# Patient Record
Sex: MALE | Race: WHITE | NOT HISPANIC OR LATINO | Employment: OTHER | URBAN - METROPOLITAN AREA
[De-identification: names, ages, dates, MRNs, and addresses within clinical notes are randomized per-mention and may not be internally consistent; named-entity substitution may affect disease eponyms.]

---

## 2023-04-25 ENCOUNTER — APPOINTMENT (OUTPATIENT)
Dept: PRIMARY CARE | Facility: CLINIC | Age: 68
End: 2023-04-25
Payer: MEDICARE

## 2023-06-12 PROBLEM — R35.1 NOCTURIA: Status: ACTIVE | Noted: 2023-06-12

## 2023-06-12 PROBLEM — E80.6 HYPERBILIRUBINEMIA: Status: ACTIVE | Noted: 2023-06-12

## 2023-06-12 PROBLEM — E55.9 VITAMIN D INSUFFICIENCY: Status: ACTIVE | Noted: 2023-06-12

## 2023-06-12 PROBLEM — M22.42 CHONDROMALACIA OF LEFT PATELLA: Status: ACTIVE | Noted: 2023-06-12

## 2023-06-12 PROBLEM — M25.562 LEFT KNEE PAIN: Status: ACTIVE | Noted: 2023-06-12

## 2023-06-12 PROBLEM — R73.9 HYPERGLYCEMIA: Status: ACTIVE | Noted: 2023-06-12

## 2023-06-12 PROBLEM — D22.9 SKIN MOLE: Status: ACTIVE | Noted: 2023-06-12

## 2023-06-12 PROBLEM — H61.23 BILATERAL IMPACTED CERUMEN: Status: ACTIVE | Noted: 2023-06-12

## 2023-06-12 PROBLEM — M25.552 PAIN OF LEFT HIP JOINT: Status: ACTIVE | Noted: 2023-06-12

## 2023-06-12 PROBLEM — I10 BENIGN ESSENTIAL HYPERTENSION: Status: ACTIVE | Noted: 2023-06-12

## 2023-06-12 PROBLEM — E78.5 HYPERLIPIDEMIA: Status: ACTIVE | Noted: 2023-06-12

## 2023-06-12 RX ORDER — LISINOPRIL 10 MG/1
1 TABLET ORAL DAILY
COMMUNITY
Start: 2018-08-14 | End: 2023-06-15 | Stop reason: ALTCHOICE

## 2023-06-12 RX ORDER — MUPIROCIN 20 MG/G
OINTMENT TOPICAL
COMMUNITY
Start: 2023-05-06 | End: 2023-06-15 | Stop reason: ALTCHOICE

## 2023-06-12 RX ORDER — CEPHALEXIN 500 MG/1
CAPSULE ORAL
COMMUNITY
Start: 2023-05-06 | End: 2023-06-15 | Stop reason: ALTCHOICE

## 2023-06-12 RX ORDER — TAMSULOSIN HYDROCHLORIDE 0.4 MG/1
1 CAPSULE ORAL DAILY
COMMUNITY
Start: 2018-08-14 | End: 2023-06-15 | Stop reason: ALTCHOICE

## 2023-06-12 RX ORDER — SULFAMETHOXAZOLE AND TRIMETHOPRIM 800; 160 MG/1; MG/1
TABLET ORAL
COMMUNITY
Start: 2023-05-06 | End: 2023-06-15 | Stop reason: ALTCHOICE

## 2023-06-12 RX ORDER — MULTIVITAMIN
1 TABLET ORAL DAILY
COMMUNITY
Start: 2016-11-07 | End: 2023-06-15 | Stop reason: ALTCHOICE

## 2023-06-15 ENCOUNTER — OFFICE VISIT (OUTPATIENT)
Dept: PRIMARY CARE | Facility: CLINIC | Age: 68
End: 2023-06-15
Payer: MEDICARE

## 2023-06-15 VITALS
BODY MASS INDEX: 29.68 KG/M2 | HEIGHT: 71 IN | TEMPERATURE: 98 F | HEART RATE: 85 BPM | SYSTOLIC BLOOD PRESSURE: 155 MMHG | DIASTOLIC BLOOD PRESSURE: 79 MMHG | WEIGHT: 212 LBS | OXYGEN SATURATION: 95 %

## 2023-06-15 DIAGNOSIS — I10 BENIGN ESSENTIAL HYPERTENSION: Primary | ICD-10-CM

## 2023-06-15 DIAGNOSIS — R73.9 HYPERGLYCEMIA: ICD-10-CM

## 2023-06-15 DIAGNOSIS — E78.5 HYPERLIPIDEMIA, UNSPECIFIED HYPERLIPIDEMIA TYPE: ICD-10-CM

## 2023-06-15 DIAGNOSIS — G89.29 CHRONIC PAIN OF BOTH SHOULDERS: ICD-10-CM

## 2023-06-15 DIAGNOSIS — E55.9 VITAMIN D DEFICIENCY: ICD-10-CM

## 2023-06-15 DIAGNOSIS — M25.512 CHRONIC PAIN OF BOTH SHOULDERS: ICD-10-CM

## 2023-06-15 DIAGNOSIS — E55.9 VITAMIN D INSUFFICIENCY: ICD-10-CM

## 2023-06-15 DIAGNOSIS — M25.511 CHRONIC PAIN OF BOTH SHOULDERS: ICD-10-CM

## 2023-06-15 PROCEDURE — 1159F MED LIST DOCD IN RCRD: CPT | Performed by: STUDENT IN AN ORGANIZED HEALTH CARE EDUCATION/TRAINING PROGRAM

## 2023-06-15 PROCEDURE — 1036F TOBACCO NON-USER: CPT | Performed by: STUDENT IN AN ORGANIZED HEALTH CARE EDUCATION/TRAINING PROGRAM

## 2023-06-15 PROCEDURE — 99214 OFFICE O/P EST MOD 30 MIN: CPT | Performed by: STUDENT IN AN ORGANIZED HEALTH CARE EDUCATION/TRAINING PROGRAM

## 2023-06-15 PROCEDURE — 1160F RVW MEDS BY RX/DR IN RCRD: CPT | Performed by: STUDENT IN AN ORGANIZED HEALTH CARE EDUCATION/TRAINING PROGRAM

## 2023-06-15 PROCEDURE — 3078F DIAST BP <80 MM HG: CPT | Performed by: STUDENT IN AN ORGANIZED HEALTH CARE EDUCATION/TRAINING PROGRAM

## 2023-06-15 PROCEDURE — 3077F SYST BP >= 140 MM HG: CPT | Performed by: STUDENT IN AN ORGANIZED HEALTH CARE EDUCATION/TRAINING PROGRAM

## 2023-06-15 ASSESSMENT — COLUMBIA-SUICIDE SEVERITY RATING SCALE - C-SSRS
6. HAVE YOU EVER DONE ANYTHING, STARTED TO DO ANYTHING, OR PREPARED TO DO ANYTHING TO END YOUR LIFE?: NO
1. IN THE PAST MONTH, HAVE YOU WISHED YOU WERE DEAD OR WISHED YOU COULD GO TO SLEEP AND NOT WAKE UP?: NO
2. HAVE YOU ACTUALLY HAD ANY THOUGHTS OF KILLING YOURSELF?: NO

## 2023-06-15 ASSESSMENT — PATIENT HEALTH QUESTIONNAIRE - PHQ9
SUM OF ALL RESPONSES TO PHQ9 QUESTIONS 1 AND 2: 0
1. LITTLE INTEREST OR PLEASURE IN DOING THINGS: NOT AT ALL
2. FEELING DOWN, DEPRESSED OR HOPELESS: NOT AT ALL

## 2023-06-15 ASSESSMENT — ENCOUNTER SYMPTOMS
DEPRESSION: 0
LOSS OF SENSATION IN FEET: 0
OCCASIONAL FEELINGS OF UNSTEADINESS: 0

## 2023-06-15 NOTE — PROGRESS NOTES
67-year-old male presenting to [facility] care for follow-up on chronic conditions:    HTN, HLD, hyperglycemia,  Not taking any medications.  Patient feels well, and is hesitant to take prescription meds.    Vitamin D deficiency  Stable does take OTC supplementation    Bilateral shoulder pain  Chronic, insidious, no traumatic onset.    12 point ROS reviewed and negative other than as stated in HPI    General: Alert, oriented, pleasant, in no acute distress  HEENT:      Head: normocephalic, atraumatic;      eyes: EOMI, no scleral icterus;   CV: Heart with regular rate and rhythm, normal S1/S2, no murmurs  Lungs: CTAB without wheezing, rhonchi or rales; good respiratory effort, no increased work of breathing  Abdomen: soft, non-tender, non-distended, no masses appreciated  Extremities: no edema, no cyanosis  MSK: Bilateral positive Rosalva's testing negative AC, negative bicep  Neuro: Cranial nerves grossly intact; alert and oriented  Psych: Appropriate mood and affect    1.  HTN  - Above goal in office  -Discussed treatment and importance, patient not wanting to start medication at this time  - Home blood pressure logs given, patient to call if averaging 130/80    2.  HLD  -No medications currently  -repeat lipid panel    3.  Hyperglycemia  -A1c    4.  Vitamin D deficiency  -Repeat vitamin D lab    5. Bilateral shoulder pain  -Bilateral shoulder x-ray, discussed conservative treatment, will do home exercises for now    Follow-up 6 months Medicare    Christopher D'Amico, DO

## 2024-01-11 ENCOUNTER — APPOINTMENT (OUTPATIENT)
Dept: PRIMARY CARE | Facility: CLINIC | Age: 69
End: 2024-01-11
Payer: MEDICARE

## 2024-11-16 ENCOUNTER — OFFICE VISIT (OUTPATIENT)
Dept: URGENT CARE | Age: 69
End: 2024-11-16
Payer: MEDICARE

## 2024-11-16 VITALS
SYSTOLIC BLOOD PRESSURE: 170 MMHG | RESPIRATION RATE: 20 BRPM | WEIGHT: 220 LBS | DIASTOLIC BLOOD PRESSURE: 99 MMHG | HEIGHT: 71 IN | TEMPERATURE: 98.1 F | BODY MASS INDEX: 30.8 KG/M2 | OXYGEN SATURATION: 98 % | HEART RATE: 73 BPM

## 2024-11-16 DIAGNOSIS — R31.9 HEMATURIA, UNSPECIFIED TYPE: ICD-10-CM

## 2024-11-16 LAB
POC APPEARANCE, URINE: CLEAR
POC BILIRUBIN, URINE: NEGATIVE
POC BLOOD, URINE: NEGATIVE
POC COLOR, URINE: YELLOW
POC GLUCOSE, URINE: NEGATIVE MG/DL
POC KETONES, URINE: NEGATIVE MG/DL
POC LEUKOCYTES, URINE: NEGATIVE
POC NITRITE,URINE: NEGATIVE
POC PH, URINE: 6 PH
POC PROTEIN, URINE: NEGATIVE MG/DL
POC SPECIFIC GRAVITY, URINE: <=1.005
POC UROBILINOGEN, URINE: 0.2 EU/DL

## 2024-11-16 ASSESSMENT — PAIN SCALES - GENERAL: PAINLEVEL_OUTOF10: 0-NO PAIN

## 2024-11-16 ASSESSMENT — ENCOUNTER SYMPTOMS
EYES NEGATIVE: 1
NEUROLOGICAL NEGATIVE: 1
FREQUENCY: 0
FLANK PAIN: 1
PSYCHIATRIC NEGATIVE: 1
GASTROINTESTINAL NEGATIVE: 1
HEMATOLOGIC/LYMPHATIC NEGATIVE: 1
CONSTITUTIONAL NEGATIVE: 1
PALPITATIONS: 0
RESPIRATORY NEGATIVE: 1
HEMATURIA: 1
ENDOCRINE NEGATIVE: 1
ALLERGIC/IMMUNOLOGIC NEGATIVE: 1

## 2024-11-16 NOTE — PROGRESS NOTES
"Subjective   Patient ID: Thomas A Berardinelli is a 69 y.o. male. They present today with a chief complaint of Blood in Urine (Pt states he noticed blood in his urine yesterday. Pt c/o of frequency. ).    History of Present Illness  HPI  Pt presents to urgent care with c/o   concern for kidney stone.  Pt states yesterday he was having some mild R sided low back pain.  States he had one episode of hematuria yesterday.  He woke up this morning with R flank pain, hematuria.  States he urinated and had pain and then retrieved a kidney stone from the toilet.  States he is no longer having pain or hematuria at this time. Pt denies CP, SOB, palpitations, fevers, abd pain, n/v/d, sick contacts, recent travel.        Past Medical History  Allergies as of 11/16/2024 - Reviewed 11/16/2024   Allergen Reaction Noted    Other Other 06/12/2023       (Not in a hospital admission)       No past medical history on file.    No past surgical history on file.     reports that he has never smoked. He has never used smokeless tobacco.    Review of Systems  Review of Systems   Constitutional: Negative.    HENT: Negative.     Eyes: Negative.    Respiratory: Negative.     Cardiovascular:  Negative for chest pain and palpitations.   Gastrointestinal: Negative.    Endocrine: Negative.    Genitourinary:  Positive for flank pain and hematuria. Negative for frequency, penile pain, penile swelling, scrotal swelling, testicular pain and urgency.   Skin: Negative.    Allergic/Immunologic: Negative.    Neurological: Negative.    Hematological: Negative.    Psychiatric/Behavioral: Negative.     All other systems reviewed and are negative.                                 Objective    Vitals:    11/16/24 0926   BP: (!) 170/99   BP Location: Right arm   Patient Position: Sitting   BP Cuff Size: Adult   Pulse: 73   Resp: 20   Temp: 36.7 °C (98.1 °F)   TempSrc: Temporal   SpO2: 98%   Weight: 99.8 kg (220 lb)   Height: 1.803 m (5' 11\")     No LMP for male " patient.    Physical Exam  Vitals and nursing note reviewed.   Constitutional:       General: He is not in acute distress.     Appearance: Normal appearance. He is not ill-appearing or toxic-appearing.   HENT:      Head: Atraumatic.      Right Ear: Tympanic membrane, ear canal and external ear normal.      Left Ear: Tympanic membrane, ear canal and external ear normal.      Nose: Nose normal.      Mouth/Throat:      Mouth: Mucous membranes are moist.      Pharynx: Oropharynx is clear. No oropharyngeal exudate or posterior oropharyngeal erythema.   Eyes:      Extraocular Movements: Extraocular movements intact.      Conjunctiva/sclera: Conjunctivae normal.      Pupils: Pupils are equal, round, and reactive to light.   Cardiovascular:      Rate and Rhythm: Normal rate and regular rhythm.      Pulses: Normal pulses.      Heart sounds: Normal heart sounds.   Pulmonary:      Effort: Pulmonary effort is normal.      Breath sounds: Normal breath sounds.   Abdominal:      General: Abdomen is flat. Bowel sounds are normal.      Palpations: Abdomen is soft.      Tenderness: There is no abdominal tenderness. There is no right CVA tenderness or left CVA tenderness.   Musculoskeletal:         General: Normal range of motion.      Cervical back: Normal range of motion and neck supple. No tenderness.   Skin:     General: Skin is warm and dry.      Capillary Refill: Capillary refill takes less than 2 seconds.   Neurological:      General: No focal deficit present.      Mental Status: He is alert and oriented to person, place, and time.   Psychiatric:         Mood and Affect: Mood normal.         Behavior: Behavior normal.         Thought Content: Thought content normal.         Procedures    Point of Care Test & Imaging Results from this visit  Results for orders placed or performed in visit on 11/16/24   POCT UA Automated manually resulted   Result Value Ref Range    POC Color, Urine Yellow Straw, Yellow, Light-Yellow    POC  Appearance, Urine Clear Clear    POC Glucose, Urine NEGATIVE NEGATIVE mg/dl    POC Bilirubin, Urine NEGATIVE NEGATIVE    POC Ketones, Urine NEGATIVE NEGATIVE mg/dl    POC Specific Gravity, Urine <=1.005 1.005 - 1.035    POC Blood, Urine NEGATIVE NEGATIVE    POC PH, Urine 6.0 No Reference Range Established PH    POC Protein, Urine NEGATIVE NEGATIVE, 30 (1+) mg/dl    POC Urobilinogen, Urine 0.2 0.2, 1.0 EU/DL    Poc Nitrite, Urine NEGATIVE NEGATIVE    POC Leukocytes, Urine NEGATIVE NEGATIVE      No results found.    Diagnostic study results (if any) were reviewed by JUSTINA Rosas.    Assessment/Plan   Allergies, medications, history, and pertinent labs/EKGs/Imaging reviewed by JUSTINA Rosas.     Medical Decision Making  Pt presents with concern for kidney stone.  He brought stone with him that he retrieved from toilet after urinating this morning.  UA is negative. Pt denies pain or hematuria since passing the stone.  States he is getting established with a new PCP and will go there Monday to schedule an appointment.  Given no pain and no UTI, pt advised to increase oral fluids and follow up with PCP.      Orders and Diagnoses  Diagnoses and all orders for this visit:  Hematuria, unspecified type  -     POCT UA Automated manually resulted      Medical Admin Record      Patient disposition: Home    Electronically signed by JUSTINA Rosas  11:46 AM

## 2024-11-19 ENCOUNTER — OFFICE VISIT (OUTPATIENT)
Dept: PRIMARY CARE | Facility: CLINIC | Age: 69
End: 2024-11-19
Payer: MEDICARE

## 2024-11-19 ENCOUNTER — LAB (OUTPATIENT)
Dept: LAB | Facility: LAB | Age: 69
End: 2024-11-19
Payer: MEDICARE

## 2024-11-19 VITALS
WEIGHT: 220 LBS | HEIGHT: 71 IN | BODY MASS INDEX: 30.8 KG/M2 | DIASTOLIC BLOOD PRESSURE: 99 MMHG | HEART RATE: 70 BPM | SYSTOLIC BLOOD PRESSURE: 164 MMHG

## 2024-11-19 DIAGNOSIS — I10 BENIGN ESSENTIAL HYPERTENSION: Primary | ICD-10-CM

## 2024-11-19 DIAGNOSIS — N20.0 NEPHROLITHIASIS: ICD-10-CM

## 2024-11-19 DIAGNOSIS — I10 BENIGN ESSENTIAL HYPERTENSION: ICD-10-CM

## 2024-11-19 DIAGNOSIS — E78.5 HYPERLIPIDEMIA, UNSPECIFIED HYPERLIPIDEMIA TYPE: ICD-10-CM

## 2024-11-19 DIAGNOSIS — R39.14 BENIGN PROSTATIC HYPERPLASIA WITH INCOMPLETE BLADDER EMPTYING: ICD-10-CM

## 2024-11-19 DIAGNOSIS — R31.0 GROSS HEMATURIA: ICD-10-CM

## 2024-11-19 DIAGNOSIS — R73.9 HYPERGLYCEMIA: ICD-10-CM

## 2024-11-19 DIAGNOSIS — R35.1 NOCTURIA: ICD-10-CM

## 2024-11-19 DIAGNOSIS — E55.9 VITAMIN D INSUFFICIENCY: ICD-10-CM

## 2024-11-19 DIAGNOSIS — N40.1 BENIGN PROSTATIC HYPERPLASIA WITH INCOMPLETE BLADDER EMPTYING: ICD-10-CM

## 2024-11-19 DIAGNOSIS — Z12.5 PROSTATE CANCER SCREENING: ICD-10-CM

## 2024-11-19 DIAGNOSIS — R39.15 URGENCY OF URINATION: ICD-10-CM

## 2024-11-19 LAB
25(OH)D3 SERPL-MCNC: 37 NG/ML (ref 30–100)
ALBUMIN SERPL BCP-MCNC: 4.5 G/DL (ref 3.4–5)
ALP SERPL-CCNC: 67 U/L (ref 33–136)
ALT SERPL W P-5'-P-CCNC: 19 U/L (ref 10–52)
ANION GAP SERPL CALC-SCNC: 11 MMOL/L (ref 10–20)
AST SERPL W P-5'-P-CCNC: 20 U/L (ref 9–39)
BASOPHILS # BLD AUTO: 0.05 X10*3/UL (ref 0–0.1)
BASOPHILS NFR BLD AUTO: 0.7 %
BILIRUB SERPL-MCNC: 1.8 MG/DL (ref 0–1.2)
BUN SERPL-MCNC: 9 MG/DL (ref 6–23)
CALCIUM SERPL-MCNC: 9.6 MG/DL (ref 8.6–10.3)
CHLORIDE SERPL-SCNC: 102 MMOL/L (ref 98–107)
CHOLEST SERPL-MCNC: 219 MG/DL (ref 0–199)
CHOLESTEROL/HDL RATIO: 3.2
CO2 SERPL-SCNC: 30 MMOL/L (ref 21–32)
CREAT SERPL-MCNC: 0.77 MG/DL (ref 0.5–1.3)
EGFRCR SERPLBLD CKD-EPI 2021: >90 ML/MIN/1.73M*2
EOSINOPHIL # BLD AUTO: 0.18 X10*3/UL (ref 0–0.7)
EOSINOPHIL NFR BLD AUTO: 2.5 %
ERYTHROCYTE [DISTWIDTH] IN BLOOD BY AUTOMATED COUNT: 13.1 % (ref 11.5–14.5)
EST. AVERAGE GLUCOSE BLD GHB EST-MCNC: 88 MG/DL
GLUCOSE SERPL-MCNC: 92 MG/DL (ref 74–99)
HBA1C MFR BLD: 4.7 %
HCT VFR BLD AUTO: 44.3 % (ref 41–52)
HDLC SERPL-MCNC: 69.2 MG/DL
HGB BLD-MCNC: 14.3 G/DL (ref 13.5–17.5)
IMM GRANULOCYTES # BLD AUTO: 0.01 X10*3/UL (ref 0–0.7)
IMM GRANULOCYTES NFR BLD AUTO: 0.1 % (ref 0–0.9)
LDLC SERPL CALC-MCNC: 124 MG/DL
LYMPHOCYTES # BLD AUTO: 1.59 X10*3/UL (ref 1.2–4.8)
LYMPHOCYTES NFR BLD AUTO: 21.7 %
MCH RBC QN AUTO: 29.1 PG (ref 26–34)
MCHC RBC AUTO-ENTMCNC: 32.3 G/DL (ref 32–36)
MCV RBC AUTO: 90 FL (ref 80–100)
MONOCYTES # BLD AUTO: 0.62 X10*3/UL (ref 0.1–1)
MONOCYTES NFR BLD AUTO: 8.5 %
NEUTROPHILS # BLD AUTO: 4.87 X10*3/UL (ref 1.2–7.7)
NEUTROPHILS NFR BLD AUTO: 66.5 %
NON HDL CHOLESTEROL: 150 MG/DL (ref 0–149)
NRBC BLD-RTO: 0 /100 WBCS (ref 0–0)
PLATELET # BLD AUTO: 201 X10*3/UL (ref 150–450)
POTASSIUM SERPL-SCNC: 4.6 MMOL/L (ref 3.5–5.3)
PROT SERPL-MCNC: 7.4 G/DL (ref 6.4–8.2)
RBC # BLD AUTO: 4.92 X10*6/UL (ref 4.5–5.9)
SODIUM SERPL-SCNC: 138 MMOL/L (ref 136–145)
TRIGL SERPL-MCNC: 128 MG/DL (ref 0–149)
URATE SERPL-MCNC: 3.2 MG/DL (ref 4–7.5)
VLDL: 26 MG/DL (ref 0–40)
WBC # BLD AUTO: 7.3 X10*3/UL (ref 4.4–11.3)

## 2024-11-19 PROCEDURE — 82306 VITAMIN D 25 HYDROXY: CPT

## 2024-11-19 PROCEDURE — 99214 OFFICE O/P EST MOD 30 MIN: CPT | Performed by: INTERNAL MEDICINE

## 2024-11-19 PROCEDURE — G0103 PSA SCREENING: HCPCS

## 2024-11-19 PROCEDURE — 85025 COMPLETE CBC W/AUTO DIFF WBC: CPT

## 2024-11-19 PROCEDURE — 3077F SYST BP >= 140 MM HG: CPT | Performed by: INTERNAL MEDICINE

## 2024-11-19 PROCEDURE — 1160F RVW MEDS BY RX/DR IN RCRD: CPT | Performed by: INTERNAL MEDICINE

## 2024-11-19 PROCEDURE — 1159F MED LIST DOCD IN RCRD: CPT | Performed by: INTERNAL MEDICINE

## 2024-11-19 PROCEDURE — 84550 ASSAY OF BLOOD/URIC ACID: CPT

## 2024-11-19 PROCEDURE — 3008F BODY MASS INDEX DOCD: CPT | Performed by: INTERNAL MEDICINE

## 2024-11-19 PROCEDURE — 3080F DIAST BP >= 90 MM HG: CPT | Performed by: INTERNAL MEDICINE

## 2024-11-19 PROCEDURE — 82365 CALCULUS SPECTROSCOPY: CPT

## 2024-11-19 PROCEDURE — 80053 COMPREHEN METABOLIC PANEL: CPT

## 2024-11-19 PROCEDURE — 83036 HEMOGLOBIN GLYCOSYLATED A1C: CPT

## 2024-11-19 PROCEDURE — 36415 COLL VENOUS BLD VENIPUNCTURE: CPT

## 2024-11-19 PROCEDURE — G2211 COMPLEX E/M VISIT ADD ON: HCPCS | Performed by: INTERNAL MEDICINE

## 2024-11-19 PROCEDURE — 1036F TOBACCO NON-USER: CPT | Performed by: INTERNAL MEDICINE

## 2024-11-19 PROCEDURE — 80061 LIPID PANEL: CPT

## 2024-11-19 RX ORDER — LISINOPRIL AND HYDROCHLOROTHIAZIDE 10; 12.5 MG/1; MG/1
1 TABLET ORAL DAILY
Qty: 30 TABLET | Refills: 3 | Status: SHIPPED | OUTPATIENT
Start: 2024-11-19 | End: 2025-12-24

## 2024-11-19 ASSESSMENT — PATIENT HEALTH QUESTIONNAIRE - PHQ9
2. FEELING DOWN, DEPRESSED OR HOPELESS: SEVERAL DAYS
10. IF YOU CHECKED OFF ANY PROBLEMS, HOW DIFFICULT HAVE THESE PROBLEMS MADE IT FOR YOU TO DO YOUR WORK, TAKE CARE OF THINGS AT HOME, OR GET ALONG WITH OTHER PEOPLE: NOT DIFFICULT AT ALL
SUM OF ALL RESPONSES TO PHQ9 QUESTIONS 1 AND 2: 1
1. LITTLE INTEREST OR PLEASURE IN DOING THINGS: NOT AT ALL

## 2024-11-19 ASSESSMENT — LIFESTYLE VARIABLES
HOW MANY STANDARD DRINKS CONTAINING ALCOHOL DO YOU HAVE ON A TYPICAL DAY: 1 OR 2
SKIP TO QUESTIONS 9-10: 1
HOW OFTEN DO YOU HAVE A DRINK CONTAINING ALCOHOL: 2-3 TIMES A WEEK
AUDIT-C TOTAL SCORE: 3
HAS A RELATIVE, FRIEND, DOCTOR, OR ANOTHER HEALTH PROFESSIONAL EXPRESSED CONCERN ABOUT YOUR DRINKING OR SUGGESTED YOU CUT DOWN: NO
HOW OFTEN DO YOU HAVE SIX OR MORE DRINKS ON ONE OCCASION: NEVER
AUDIT TOTAL SCORE: 3
HAVE YOU OR SOMEONE ELSE BEEN INJURED AS A RESULT OF YOUR DRINKING: NO

## 2024-11-19 NOTE — ASSESSMENT & PLAN NOTE
Will hold off any medication and he will be further followed up depending on the workup we do at this time.  He may benefit from alpha blockers such as Flomax his symptoms continues.

## 2024-11-19 NOTE — ASSESSMENT & PLAN NOTE
Patient just passed the kidney stones.  Will send the kidney stone for analysis which he says he is going to take to his son's lab and have it analyzed there if not he will take it to the  lab request was given to the patient.  Will do the ultrasound of the kidneys to make sure there is no hydronephrosis and/or no other stones still present.  Will hold off CT scan at this time and decide about CT scan if it is needed.

## 2024-11-19 NOTE — ASSESSMENT & PLAN NOTE
Patient never took any medications.  I discussed the importance of blood pressure control.  Patient verbalizes the understanding and he will monitor home blood pressure readings.  I would start him on lisinopril hydrochlorothiazide and discussed the hydrochlorothiazide may decrease the frequency of renal stone if they are calcium stone.  He will monitor his blood pressure at home and will bring it to me.

## 2024-11-19 NOTE — PROGRESS NOTES
Assessment/Plan     69 years old male was seen as a new patient and multiple problems were reviewed and discussed.  Patient will be followed up in about 3 to 4 weeks and he will transfer the blood test which are done from his son's lab to asked to be reviewed and rest of the additional blood work he will do it before his next visit.        Problem List Items Addressed This Visit       Benign essential hypertension - Primary     Patient never took any medications.  I discussed the importance of blood pressure control.  Patient verbalizes the understanding and he will monitor home blood pressure readings.  I would start him on lisinopril hydrochlorothiazide and discussed the hydrochlorothiazide may decrease the frequency of renal stone if they are calcium stone.  He will monitor his blood pressure at home and will bring it to me.         Relevant Medications    lisinopriL-hydrochlorothiazide 10-12.5 mg tablet    Other Relevant Orders    CBC and Auto Differential    Hyperglycemia     Lifestyle modifications were reviewed and discussed and his hemoglobin A1c will be followed up.         Relevant Orders    Hemoglobin A1C    Hyperlipidemia     Borderline elevated LDL from his lab reports previously.  We discussed the lifestyle changes and his LDL level will be followed up he will bring the lab from his son's lab which was done about 1 month ago.         Relevant Orders    Comprehensive Metabolic Panel    Lipid Panel    Nocturia     Will hold off any medication and he will be further followed up depending on the workup we do at this time.  He may benefit from alpha blockers such as Flomax his symptoms continues.         Relevant Orders    US bladder    Vitamin D insufficiency     His lab test will be reviewed and he was advised to start on oral replacement.         Relevant Orders    Vitamin D 25-Hydroxy,Total (for eval of Vitamin D levels)    Nephrolithiasis     Patient just passed the kidney stones.  Will send the kidney  stone for analysis which he says he is going to take to his son's lab and have it analyzed there if not he will take it to the  lab request was given to the patient.  Will do the ultrasound of the kidneys to make sure there is no hydronephrosis and/or no other stones still present.  Will hold off CT scan at this time and decide about CT scan if it is needed.         Relevant Orders    Uric Acid    Stone analysis    US renal complete    US bladder    RESOLVED: Gross hematuria     Resolved since he passed the kidney stone.  His urine may need to be further checked and he understands not to urinate before his next visit         Urgency of urination     Chronic problem and this is probably from his BPH.  Patient has no signs of infection and he just passed urine and he says at this time he does not want to do a urine culture which will be further reviewed and discussed if you continue to have symptoms when he follows up depending on his imaging studies and his symptoms.  But patient understands if he develops any fever burning urination he should immediately call me.            Subjective     Patient ID: Thomas A Berardinelli is a 69 y.o. male who presents for Establish Care (Follow up urgicare after passing stone. ).    History of present illness  69 years old male who moved from [place] to his side recently came to have an established visit.    He has multiple concerns and complaints.    I have reviewed his visit about a year ago with a  physician.    Patient has frequency of urination mainly at nights for several years.  Recently he had acute pain and blood in the urine and he went to an urgent care center.  He has had no imaging studies or other blood test done.  He passed urine stone which he brought to the office in and I have reviewed the urine stone which is broken into at least 4 pieces.    He carries a urinal in his truck so that he has to urinate suddenly but he says he has no significant frequency  during the daytime but at nights he has to get up and urinate at least 3-4 times.    He exercises 5 times a week and also he keeps himself active.    He has had borderline elevated blood sugars in the past and also he had elevated blood pressure mostly during the office visits.  He check his blood pressure at home.    Patient's son runs a lab company and he says he had a blood test done about a month ago he did not have the blood reports on this visit.    He denies any chest pain no short of breath no abdominal pain no leg swellings.    Rest of the review of systems no acute complaints.    Family History   Problem Relation Name Age of Onset    Anxiety disorder Mother      Throat cancer Brother        Social History     Socioeconomic History    Marital status:      Spouse name: Not on file    Number of children: Not on file    Years of education: Not on file    Highest education level: Not on file   Occupational History    Not on file   Tobacco Use    Smoking status: Never    Smokeless tobacco: Never   Vaping Use    Vaping status: Never Used   Substance and Sexual Activity    Alcohol use: Yes     Alcohol/week: 2.0 standard drinks of alcohol     Types: 2 Cans of beer per week    Drug use: Never    Sexual activity: Not on file   Other Topics Concern    Not on file   Social History Narrative    Not on file     Social Drivers of Health     Financial Resource Strain: Not on file   Food Insecurity: Not on file   Transportation Needs: Not on file   Physical Activity: Not on file   Stress: Not on file   Social Connections: Not on file   Intimate Partner Violence: Not on file   Housing Stability: Not on file      Other   Current Outpatient Medications   Medication Sig Dispense Refill    lisinopriL-hydrochlorothiazide 10-12.5 mg tablet Take 1 tablet by mouth once daily. 30 tablet 3     No current facility-administered medications for this visit.        Objective     Vitals:    11/19/24 0928   BP: (!) 164/99   Pulse: 70         Physical Exam   Normal-built, well-nourished  with no apparent distress. Alert oriented  Skin:  Normal turgor.  No rash.  Head:  Normocephalic, atraumatic.  Eyes:  Pupils are equal, round,.  No pallor of conjunctivae.  Mouth has moist oral mucosa.  Neck:  Supple.  No JVD.  No carotid bruit.  No thyromegaly. No cervical lymphadenopathy.  No clubbing  Chest:  Vesicular breathing Bilaterally good air entry and bilaterally clear to auscultation.  No wheezing.  No crackles.  Heart:  Regular rate and rhythm.  S1, S2 positive.  No murmur.  Abdomen:  Soft and nontender.  Bowel sounds are positive.  No organomegaly.  Extremities: Left foot bunion present with valgus deformity of the big toe, bilaterally no pedal pitting edema.  Bilaterally 2+ dorsalis pedis pulses.  No calf tenderness. Homans sign is negative.  Neuro Exam: No focal signs. Gait is normal.        Problem List Items Addressed This Visit       Benign essential hypertension - Primary     Patient never took any medications.  I discussed the importance of blood pressure control.  Patient verbalizes the understanding and he will monitor home blood pressure readings.  I would start him on lisinopril hydrochlorothiazide and discussed the hydrochlorothiazide may decrease the frequency of renal stone if they are calcium stone.  He will monitor his blood pressure at home and will bring it to me.         Relevant Medications    lisinopriL-hydrochlorothiazide 10-12.5 mg tablet    Other Relevant Orders    CBC and Auto Differential    Hyperglycemia     Lifestyle modifications were reviewed and discussed and his hemoglobin A1c will be followed up.         Relevant Orders    Hemoglobin A1C    Hyperlipidemia     Borderline elevated LDL from his lab reports previously.  We discussed the lifestyle changes and his LDL level will be followed up he will bring the lab from his son's lab which was done about 1 month ago.         Relevant Orders    Comprehensive Metabolic Panel     Lipid Panel    Nocturia     Will hold off any medication and he will be further followed up depending on the workup we do at this time.  He may benefit from alpha blockers such as Flomax his symptoms continues.         Relevant Orders    US bladder    Vitamin D insufficiency     His lab test will be reviewed and he was advised to start on oral replacement.         Relevant Orders    Vitamin D 25-Hydroxy,Total (for eval of Vitamin D levels)    Nephrolithiasis     Patient just passed the kidney stones.  Will send the kidney stone for analysis which he says he is going to take to his son's lab and have it analyzed there if not he will take it to the  lab request was given to the patient.  Will do the ultrasound of the kidneys to make sure there is no hydronephrosis and/or no other stones still present.  Will hold off CT scan at this time and decide about CT scan if it is needed.         Relevant Orders    Uric Acid    Stone analysis    US renal complete    US bladder    RESOLVED: Gross hematuria     Resolved since he passed the kidney stone.  His urine may need to be further checked and he understands not to urinate before his next visit         Urgency of urination     Chronic problem and this is probably from his BPH.  Patient has no signs of infection and he just passed urine and he says at this time he does not want to do a urine culture which will be further reviewed and discussed if you continue to have symptoms when he follows up depending on his imaging studies and his symptoms.  But patient understands if he develops any fever burning urination he should immediately call me.             Orders Placed This Encounter   Procedures    US renal complete     Standing Status:   Future     Standing Expiration Date:   11/19/2025     Order Specific Question:   Reason for exam:     Answer:   nephrolithiasis     Order Specific Question:   Radiologist to Determine Optimal Study     Answer:   Yes     Order Specific  Question:   Release result to MyChart     Answer:   Immediate [1]     Order Specific Question:   Is this exam part of a Research Study? If Yes, link this order to the research study     Answer:   No    US bladder     Standing Status:   Future     Standing Expiration Date:   11/19/2025     Order Specific Question:   Reason for exam:     Answer:   frequent urination, residual urine     Order Specific Question:   Radiologist to Determine Optimal Study     Answer:   Yes     Order Specific Question:   Release result to MyChart     Answer:   Immediate [1]     Order Specific Question:   Is this exam part of a Research Study? If Yes, link this order to the research study     Answer:   No    CBC and Auto Differential     Standing Status:   Future     Standing Expiration Date:   5/19/2025     Order Specific Question:   Release result to MyChart     Answer:   Immediate    Comprehensive Metabolic Panel     Standing Status:   Future     Standing Expiration Date:   5/19/2025     Order Specific Question:   Release result to [facility]     Answer:   Immediate    Lipid Panel     fasting     Standing Status:   Future     Standing Expiration Date:   5/19/2025     Order Specific Question:   Release result to [facility]     Answer:   Immediate [1]    Uric Acid     Standing Status:   Future     Standing Expiration Date:   5/19/2025     Order Specific Question:   Release result to MyChart     Answer:   Immediate [1]    Vitamin D 25-Hydroxy,Total (for eval of Vitamin D levels)     Standing Status:   Future     Standing Expiration Date:   5/19/2025     Order Specific Question:   Release result to [facility]     Answer:   Immediate    Stone analysis     Standing Status:   Future     Number of Occurrences:   1     Standing Expiration Date:   11/19/2025     Order Specific Question:   Release result to [facility]     Answer:   Immediate [1]    Hemoglobin A1C     Standing Status:   Future     Standing Expiration Date:   5/19/2025     Order Specific  Question:   [facility]     Answer:   Immediate        Lab Results   Component Value Date    WBC 9.1 10/08/2021    HGB 14.2 10/08/2021    HCT 41.8 10/08/2021     10/08/2021    CHOL 201 (H) 01/13/2023    TRIG 95 01/13/2023    HDL 72.8 01/13/2023    ALT 21 01/13/2023    AST 18 01/13/2023     01/13/2023    K 4.0 01/13/2023     01/13/2023    CREATININE 0.75 01/13/2023    BUN 10 01/13/2023    CO2 30 01/13/2023    TSH 2.36 01/13/2023    HGBA1C 5.1 10/08/2021     Lab Results   Component Value Date    CHOL 201 (H) 01/13/2023    CHHDL 2.8 01/13/2023       No results found.      Patient was identified as a fall risk. Risk prevention instructions provided.

## 2024-11-19 NOTE — PATIENT INSTRUCTIONS

## 2024-11-19 NOTE — ASSESSMENT & PLAN NOTE
Chronic problem and this is probably from his BPH.  Patient has no signs of infection and he just passed urine and he says at this time he does not want to do a urine culture which will be further reviewed and discussed if you continue to have symptoms when he follows up depending on his imaging studies and his symptoms.  But patient understands if he develops any fever burning urination he should immediately call me.

## 2024-11-19 NOTE — ASSESSMENT & PLAN NOTE
Resolved since he passed the kidney stone.  His urine may need to be further checked and he understands not to urinate before his next visit

## 2024-11-19 NOTE — ASSESSMENT & PLAN NOTE
Borderline elevated LDL from his lab reports previously.  We discussed the lifestyle changes and his LDL level will be followed up he will bring the lab from his son's lab which was done about 1 month ago.

## 2024-11-20 ENCOUNTER — HOSPITAL ENCOUNTER (OUTPATIENT)
Dept: RADIOLOGY | Facility: HOSPITAL | Age: 69
Discharge: HOME | End: 2024-11-20
Payer: MEDICARE

## 2024-11-20 DIAGNOSIS — N20.0 NEPHROLITHIASIS: ICD-10-CM

## 2024-11-20 PROCEDURE — 76770 US EXAM ABDO BACK WALL COMP: CPT | Performed by: STUDENT IN AN ORGANIZED HEALTH CARE EDUCATION/TRAINING PROGRAM

## 2024-11-20 PROCEDURE — 76770 US EXAM ABDO BACK WALL COMP: CPT

## 2024-11-20 NOTE — RESULT ENCOUNTER NOTE
Labs acceptable but there are a few abnormalities similar to previous labs including slightly elevated bad cholesterol LDL.  Kidney stone analysis is still pending.  Please advise patient to continue the same medications and follow-up to review in detail and discuss the management options as already scheduled appointment.

## 2024-11-22 ENCOUNTER — TELEPHONE (OUTPATIENT)
Dept: PRIMARY CARE | Facility: CLINIC | Age: 69
End: 2024-11-22
Payer: MEDICARE

## 2024-11-22 DIAGNOSIS — R39.14 BENIGN PROSTATIC HYPERPLASIA WITH INCOMPLETE BLADDER EMPTYING: Primary | ICD-10-CM

## 2024-11-22 DIAGNOSIS — N40.1 BENIGN PROSTATIC HYPERPLASIA WITH INCOMPLETE BLADDER EMPTYING: Primary | ICD-10-CM

## 2024-11-22 DIAGNOSIS — Z12.5 PROSTATE CANCER SCREENING: ICD-10-CM

## 2024-11-22 DIAGNOSIS — R39.15 URGENCY OF URINATION: ICD-10-CM

## 2024-11-22 LAB
APPEARANCE STONE: NORMAL
COMPN STONE: NORMAL
SPECIMEN WT: 124 MG

## 2024-11-22 RX ORDER — TAMSULOSIN HYDROCHLORIDE 0.4 MG/1
0.4 CAPSULE ORAL DAILY
Qty: 30 CAPSULE | Refills: 1 | Status: SHIPPED | OUTPATIENT
Start: 2024-11-22 | End: 2025-11-22

## 2024-11-22 NOTE — RESULT ENCOUNTER NOTE
Ultrasound shows enlarged prostate with some urinary retention after he voids.  I have sent prescription medication called Flomax which will help to empty the bladder and may decrease the nocturia or the frequency.  He should start it at nights.  Also I have placed a referral to urologist and please advise patient to make an appointment and see the urologist early as possible.  He should keep the follow-up appointment with me as scheduled.

## 2024-11-22 NOTE — TELEPHONE ENCOUNTER
----- Message from Bert Lake sent at 11/22/2024 12:24 PM EST -----  Ultrasound shows enlarged prostate with some urinary retention after he voids.  I have sent prescription medication called Flomax which will help to empty the bladder and may decrease the nocturia or the frequency.  He should start it at nights.  Also I have placed a referral to urologist and please advise patient to make an appointment and see the urologist early as possible.  He should keep the follow-up appointment with me as scheduled.

## 2024-11-23 LAB — PSA SERPL-MCNC: 3.43 NG/ML

## 2024-12-17 ENCOUNTER — TELEPHONE (OUTPATIENT)
Dept: PRIMARY CARE | Facility: CLINIC | Age: 69
End: 2024-12-17

## 2024-12-17 ENCOUNTER — APPOINTMENT (OUTPATIENT)
Dept: PRIMARY CARE | Facility: CLINIC | Age: 69
End: 2024-12-17
Payer: MEDICARE

## 2024-12-17 VITALS
HEART RATE: 75 BPM | WEIGHT: 223 LBS | BODY MASS INDEX: 31.22 KG/M2 | SYSTOLIC BLOOD PRESSURE: 169 MMHG | HEIGHT: 71 IN | DIASTOLIC BLOOD PRESSURE: 89 MMHG

## 2024-12-17 DIAGNOSIS — I10 BENIGN ESSENTIAL HYPERTENSION: ICD-10-CM

## 2024-12-17 DIAGNOSIS — R39.14 BENIGN PROSTATIC HYPERPLASIA WITH INCOMPLETE BLADDER EMPTYING: ICD-10-CM

## 2024-12-17 DIAGNOSIS — R35.1 NOCTURIA: ICD-10-CM

## 2024-12-17 DIAGNOSIS — D18.03 LIVER HEMANGIOMA: ICD-10-CM

## 2024-12-17 DIAGNOSIS — E78.5 HYPERLIPIDEMIA, UNSPECIFIED HYPERLIPIDEMIA TYPE: Primary | ICD-10-CM

## 2024-12-17 DIAGNOSIS — N40.1 BENIGN PROSTATIC HYPERPLASIA WITH INCOMPLETE BLADDER EMPTYING: ICD-10-CM

## 2024-12-17 DIAGNOSIS — R17 ELEVATED BILIRUBIN: ICD-10-CM

## 2024-12-17 DIAGNOSIS — R73.9 HYPERGLYCEMIA: ICD-10-CM

## 2024-12-17 DIAGNOSIS — Z13.220 SCREENING FOR CHOLESTEROL LEVEL: ICD-10-CM

## 2024-12-17 DIAGNOSIS — E78.2 MIXED HYPERLIPIDEMIA: ICD-10-CM

## 2024-12-17 PROCEDURE — 1159F MED LIST DOCD IN RCRD: CPT | Performed by: INTERNAL MEDICINE

## 2024-12-17 PROCEDURE — 99214 OFFICE O/P EST MOD 30 MIN: CPT | Performed by: INTERNAL MEDICINE

## 2024-12-17 PROCEDURE — 1160F RVW MEDS BY RX/DR IN RCRD: CPT | Performed by: INTERNAL MEDICINE

## 2024-12-17 PROCEDURE — 3077F SYST BP >= 140 MM HG: CPT | Performed by: INTERNAL MEDICINE

## 2024-12-17 PROCEDURE — 3079F DIAST BP 80-89 MM HG: CPT | Performed by: INTERNAL MEDICINE

## 2024-12-17 PROCEDURE — 1036F TOBACCO NON-USER: CPT | Performed by: INTERNAL MEDICINE

## 2024-12-17 PROCEDURE — G2211 COMPLEX E/M VISIT ADD ON: HCPCS | Performed by: INTERNAL MEDICINE

## 2024-12-17 PROCEDURE — 3008F BODY MASS INDEX DOCD: CPT | Performed by: INTERNAL MEDICINE

## 2024-12-17 RX ORDER — DOXAZOSIN 2 MG/1
2 TABLET ORAL 2 TIMES DAILY
Qty: 60 TABLET | Refills: 5 | Status: SHIPPED | OUTPATIENT
Start: 2024-12-17 | End: 2025-06-15

## 2024-12-17 ASSESSMENT — LIFESTYLE VARIABLES
HAS A RELATIVE, FRIEND, DOCTOR, OR ANOTHER HEALTH PROFESSIONAL EXPRESSED CONCERN ABOUT YOUR DRINKING OR SUGGESTED YOU CUT DOWN: NO
HAVE YOU OR SOMEONE ELSE BEEN INJURED AS A RESULT OF YOUR DRINKING: NO
HOW OFTEN DO YOU HAVE A DRINK CONTAINING ALCOHOL: MONTHLY OR LESS
AUDIT-C TOTAL SCORE: 1
SKIP TO QUESTIONS 9-10: 1
HOW MANY STANDARD DRINKS CONTAINING ALCOHOL DO YOU HAVE ON A TYPICAL DAY: 1 OR 2
HOW OFTEN DO YOU HAVE SIX OR MORE DRINKS ON ONE OCCASION: NEVER
AUDIT TOTAL SCORE: 1

## 2024-12-17 NOTE — ASSESSMENT & PLAN NOTE
Discussed in detail.  Patient says with the Flomax his symptoms are better but he still have problems during the daytime.  We had a lengthy discussion and decided to change the Flomax to alpha-blocker with blood pressure control.  He will have doxazosin to be taken twice a day.  He may have irritable bladder also but at this time he does not want to take oxybutynin.  He has an appointment with the urologist and he will keep the follow-up appointment as scheduled within next 1 month to see the urologist.

## 2024-12-17 NOTE — ASSESSMENT & PLAN NOTE
Blood pressure is continue to be elevated.  He says he monitors his blood pressure at home and runs below 145.  He has a close of blood pressure elevated due to stress coming to the office.  After detailed discussion it was decided that we would change the Flomax to doxazosin which will help his BPH and also his blood pressure.  Side effects were reviewed and discussed.

## 2024-12-17 NOTE — ASSESSMENT & PLAN NOTE
This is incidentally identified in the ultrasound of the kidneys.  The ultrasound of the liver is ordered and will be followed up.

## 2024-12-17 NOTE — ASSESSMENT & PLAN NOTE
Borderline and stable.  Because of the questionable hemangioma in the liver ultrasound of the liver is ordered and will be followed up.

## 2024-12-17 NOTE — TELEPHONE ENCOUNTER
Pt called in to office wanting to know why he was given an appointment with cardiologist.  Looked through office note and spoke with Dr. Lake to see why.  Appointment with cardiology was made in error.  Advised Oanh at the  and asked to call pt to schedule the appropriate appointment.

## 2024-12-17 NOTE — PROGRESS NOTES
Assessment/Plan     Patient will be followed up in 2 months and he understands to keep the follow-up appointment with the urologist.  Patient understands to do the ultrasound of the liver and follow-up in 2 months.    Problem List Items Addressed This Visit       Benign essential hypertension     Blood pressure is continue to be elevated.  He says he monitors his blood pressure at home and runs below 145.  He has a close of blood pressure elevated due to stress coming to the office.  After detailed discussion it was decided that we would change the Flomax to doxazosin which will help his BPH and also his blood pressure.  Side effects were reviewed and discussed.         Hyperglycemia    Hyperlipidemia - Primary    Nocturia     Discussed in detail.  Patient says with the Flomax his symptoms are better but he still have problems during the daytime.  We had a lengthy discussion and decided to change the Flomax to alpha-blocker with blood pressure control.  He will have doxazosin to be taken twice a day.  He may have irritable bladder also but at this time he does not want to take oxybutynin.  He has an appointment with the urologist and he will keep the follow-up appointment as scheduled within next 1 month to see the urologist.         Relevant Medications    doxazosin (Cardura) 2 mg tablet    Benign prostatic hyperplasia with incomplete bladder emptying     Discussed the ultrasound findings in detail.  Patient says with the Flomax his symptoms are better but he still have problems during the daytime.  We had a lengthy discussion and decided to change the Flomax to alpha-blocker with blood pressure control.  He will have doxazosin to be taken twice a day.  He may have irritable bladder also but at this time he does not want to take oxybutynin.  He has an appointment with the urologist and he will keep the follow-up appointment as scheduled within next 1 month to see the urologist.         Relevant Medications     doxazosin (Cardura) 2 mg tablet    Liver hemangioma    Relevant Orders    US abdomen limited liver    Elevated bilirubin    Relevant Orders    US abdomen limited liver    Screening for cholesterol level       Subjective     Patient ID: Thomas A Berardinelli is a 69 y.o. male who presents for Results.    History of present illness  69 years old male with history of hypertension was established visit recently came for a follow-up visit.  He continued to have frequent urination and nocturia but with the Flomax his symptoms have improved at least to an extent at nights.    He has no further hematuria.  He has had no further passing of urine.  He has frequent urination during the daytime.  He carries a urinal in his vehicle to use it almost every hour.    He is active.  He does not want to take too many medications.    He is tolerating his blood pressure medication well.    He denies any chest pain or short of breath.    Family History   Problem Relation Name Age of Onset    Anxiety disorder Mother      Throat cancer Brother        Social History     Socioeconomic History    Marital status:      Spouse name: Not on file    Number of children: Not on file    Years of education: Not on file    Highest education level: Not on file   Occupational History    Not on file   Tobacco Use    Smoking status: Never    Smokeless tobacco: Never   Vaping Use    Vaping status: Never Used   Substance and Sexual Activity    Alcohol use: Yes     Alcohol/week: 2.0 standard drinks of alcohol     Types: 2 Cans of beer per week    Drug use: Never    Sexual activity: Not on file   Other Topics Concern    Not on file   Social History Narrative    Not on file     Social Drivers of Health     Financial Resource Strain: Not on file   Food Insecurity: Not on file   Transportation Needs: Not on file   Physical Activity: Not on file   Stress: Not on file   Social Connections: Not on file   Intimate Partner Violence: Not on file   Housing  Stability: Not on file      Other   Current Outpatient Medications   Medication Sig Dispense Refill    lisinopriL-hydrochlorothiazide 10-12.5 mg tablet Take 1 tablet by mouth once daily. 30 tablet 3    doxazosin (Cardura) 2 mg tablet Take 1 tablet (2 mg) by mouth 2 times a day. 60 tablet 5     No current facility-administered medications for this visit.        Objective     Vitals:    12/17/24 1323   BP: 169/89   Pulse: 75        Physical Exam   Normal-built, well-nourished  with no apparent distress. Alert oriented  Skin:  Normal turgor.  No rash.  Head:  Normocephalic, atraumatic.  Eyes:  Pupils are equal, round,.  No pallor of conjunctivae.  Mouth has moist oral mucosa.  Neck:  Supple.  No JVD.  No carotid bruit.  No thyromegaly. No cervical lymphadenopathy.  No clubbing  Chest:  Vesicular breathing Bilaterally good air entry and bilaterally clear to auscultation.  No wheezing.  No crackles.  Heart:  Regular rate and rhythm.  S1, S2 positive.  No murmur.  Abdomen:  Soft and nontender.  Bowel sounds are positive.  No organomegaly.  Extremities: Bilaterally no pedal pitting edema.  Bilaterally 2+ dorsalis pedis pulses.  No calf tenderness. Homans sign is negative.  Neuro Exam: No focal signs. Gait is normal.        Problem List Items Addressed This Visit       Benign essential hypertension     Blood pressure is continue to be elevated.  He says he monitors his blood pressure at home and runs below 145.  He has a close of blood pressure elevated due to stress coming to the office.  After detailed discussion it was decided that we would change the Flomax to doxazosin which will help his BPH and also his blood pressure.  Side effects were reviewed and discussed.         Hyperglycemia    Hyperlipidemia - Primary    Nocturia     Discussed in detail.  Patient says with the Flomax his symptoms are better but he still have problems during the daytime.  We had a lengthy discussion and decided to change the Flomax to  alpha-blocker with blood pressure control.  He will have doxazosin to be taken twice a day.  He may have irritable bladder also but at this time he does not want to take oxybutynin.  He has an appointment with the urologist and he will keep the follow-up appointment as scheduled within next 1 month to see the urologist.         Relevant Medications    doxazosin (Cardura) 2 mg tablet    Benign prostatic hyperplasia with incomplete bladder emptying     Discussed the ultrasound findings in detail.  Patient says with the Flomax his symptoms are better but he still have problems during the daytime.  We had a lengthy discussion and decided to change the Flomax to alpha-blocker with blood pressure control.  He will have doxazosin to be taken twice a day.  He may have irritable bladder also but at this time he does not want to take oxybutynin.  He has an appointment with the urologist and he will keep the follow-up appointment as scheduled within next 1 month to see the urologist.         Relevant Medications    doxazosin (Cardura) 2 mg tablet    Liver hemangioma    Relevant Orders    US abdomen limited liver    Elevated bilirubin    Relevant Orders    US abdomen limited liver    Screening for cholesterol level        Orders Placed This Encounter   Procedures    US abdomen limited liver     Standing Status:   Future     Standing Expiration Date:   12/17/2025     Order Specific Question:   Portable?     Answer:   No     Order Specific Question:   Reason for exam:     Answer:   LIVER hemangioma, pineda bilirubin     Order Specific Question:   Radiologist to Determine Optimal Study     Answer:   Yes     Order Specific Question:   [id number]     Answer:   Immediate [1]     Order Specific Question:   Is this exam part of a Research Study? If Yes, link this order to the research study     Answer:   No        Lab Results   Component Value Date    WBC 7.3 11/19/2024    HGB 14.3 11/19/2024    HCT 44.3 11/19/2024    PLT  201 11/19/2024    CHOL 219 (H) 11/19/2024    TRIG 128 11/19/2024    HDL 69.2 11/19/2024    ALT 19 11/19/2024    AST 20 11/19/2024     11/19/2024    K 4.6 11/19/2024     11/19/2024    CREATININE 0.77 11/19/2024    BUN 9 11/19/2024    CO2 30 11/19/2024    TSH 2.36 01/13/2023    HGBA1C 4.7 11/19/2024     Lab Results   Component Value Date    CHOL 219 (H) 11/19/2024    LDLCALC 124 (H) 11/19/2024    CHHDL 3.2 11/19/2024       US renal complete    Result Date: 11/21/2024  Interpreted By:  Lobito Tracy, STUDY: US RENAL COMPLETE;  11/20/2024 2:00 pm   INDICATION: Signs/Symptoms:nephrolithiasis.   ,N20.0 Calculus of kidney   COMPARISON: None.   ACCESSION NUMBER(S): [id number]   ORDERING CLINICIAN: JOE HAWK   TECHNIQUE: Multiple images of the kidneys were obtained.   FINDINGS: Right Kidney: The right kidney measures 12.1 in length. The renal cortical echogenicity is within normal limits. No hydronephrosis. No nephrolithiasis.   Left Kidney: The left kidney measures 11.8 cm in length. The renal cortical echogenicity is within normal limits. No hydronephrosis. No nephrolithiasis.   Bladder: The urinary bladder is unremarkable in appearance. Pre void bladder volume is calculated at 199 mL with postvoid bladder volume calculated at 97 mL.   Other: The prostate gland is enlarged measuring 6.1 x 6.4 x 5.8 cm.   A hyperechoic lesion is incidentally seen in the right hepatic lobe measuring 0.6 x 0.6 x 0.6 cm.       Prostatomegaly with findings concerning for urinary retention. Correlation with PSA is recommended.   Nonspecific 0.6 cm hyperechoic lesion in the right hepatic lobe likely representing a hepatic hemangioma.   MACRO: None   Signed by: Lobito Tracy 11/21/2024 6:21 PM Dictation workstation:   [id number]                Patient was identified as a fall risk. Risk prevention instructions provided.

## 2024-12-17 NOTE — ASSESSMENT & PLAN NOTE
Discussed the ultrasound findings in detail.  Patient says with the Flomax his symptoms are better but he still have problems during the daytime.  We had a lengthy discussion and decided to change the Flomax to alpha-blocker with blood pressure control.  He will have doxazosin to be taken twice a day.  He may have irritable bladder also but at this time he does not want to take oxybutynin.  He has an appointment with the urologist and he will keep the follow-up appointment as scheduled within next 1 month to see the urologist.

## 2025-01-06 ENCOUNTER — APPOINTMENT (OUTPATIENT)
Dept: RADIOLOGY | Facility: HOSPITAL | Age: 70
End: 2025-01-06
Payer: MEDICARE

## 2025-01-09 ENCOUNTER — OFFICE VISIT (OUTPATIENT)
Dept: UROLOGY | Facility: CLINIC | Age: 70
End: 2025-01-09
Payer: MEDICARE

## 2025-01-09 VITALS — TEMPERATURE: 97.1 F | HEART RATE: 79 BPM | DIASTOLIC BLOOD PRESSURE: 95 MMHG | SYSTOLIC BLOOD PRESSURE: 167 MMHG

## 2025-01-09 DIAGNOSIS — R35.1 NOCTURIA: ICD-10-CM

## 2025-01-09 DIAGNOSIS — R39.14 BENIGN PROSTATIC HYPERPLASIA WITH INCOMPLETE BLADDER EMPTYING: Primary | ICD-10-CM

## 2025-01-09 DIAGNOSIS — N40.1 BENIGN PROSTATIC HYPERPLASIA WITH INCOMPLETE BLADDER EMPTYING: Primary | ICD-10-CM

## 2025-01-09 LAB
POC APPEARANCE, URINE: CLEAR
POC BILIRUBIN, URINE: NEGATIVE
POC BLOOD, URINE: NEGATIVE
POC COLOR, URINE: YELLOW
POC GLUCOSE, URINE: NEGATIVE MG/DL
POC KETONES, URINE: NEGATIVE MG/DL
POC LEUKOCYTES, URINE: NEGATIVE
POC NITRITE,URINE: NEGATIVE
POC PH, URINE: 6 PH
POC PROTEIN, URINE: NEGATIVE MG/DL
POC SPECIFIC GRAVITY, URINE: 1.01
POC UROBILINOGEN, URINE: 0.2 EU/DL

## 2025-01-09 PROCEDURE — 1159F MED LIST DOCD IN RCRD: CPT | Performed by: NURSE PRACTITIONER

## 2025-01-09 PROCEDURE — 3080F DIAST BP >= 90 MM HG: CPT | Performed by: NURSE PRACTITIONER

## 2025-01-09 PROCEDURE — 99203 OFFICE O/P NEW LOW 30 MIN: CPT | Performed by: NURSE PRACTITIONER

## 2025-01-09 PROCEDURE — 3077F SYST BP >= 140 MM HG: CPT | Performed by: NURSE PRACTITIONER

## 2025-01-09 PROCEDURE — 81003 URINALYSIS AUTO W/O SCOPE: CPT | Performed by: NURSE PRACTITIONER

## 2025-01-09 PROCEDURE — G2211 COMPLEX E/M VISIT ADD ON: HCPCS | Performed by: NURSE PRACTITIONER

## 2025-01-09 NOTE — PROGRESS NOTES
Subjective   Patient ID: Thomas A Berardinelli is a 69 y.o. male who presents for Urinary Frequency and Nocturia.  Patient presents to [facility] care with significant urinary frequency. NTF varies from 3-6. He was previously on tamsulosin which was inadequate. Switched to doxazosin 2mg. Urinating every 2-3 hours, minimal urgency. Hikes most days of the week approximately 1.5 hours, usually has to go on the hike. Feels that he has a good stream. Denies dysuria. 1st nephrolithiasis passed 1 month ago.     Drinks 2 cups of coffee in AM and at least 1 gallon of water/juice. Occasional beer/wine.     PSA 3.43 in Nov 2024        Review of Systems   All other systems reviewed and are negative.      Objective   Physical Exam  Vitals reviewed.     Constitutional: Patient generally appears stated age. Good nutrition. No deformities. Good attention to grooming.  Neck: No neck masses. Good symmetry. No crepitus. Normal thyroid size and consistency with no masses.  Respiratory: Normal respiratory effort. No intercostal retractions. No use of accessory muscles.  Cardiovascular: Examination of the peripheral vascular system reveals no swelling or varicosities with good pulses. Normal extremity temperature, no edema or tenderness.  Abdomen: Examination of the abdomen reveals no masses or tenderness. No hernias detected. Normal liver and spleen size.   Lymphatic: No palpable lymph nodes in the neck, axilla, groin or other locations  Skin: Inspection of the skin reveals no rashes, lesions, ulcers.  Neurologic/Psychiatric: Oriented to time, place and person. Normal mood and affect with no depression, anxiety, or agitation.    Office Visit on 01/09/2025   Component Date Value Ref Range Status    POC Color, Urine 01/09/2025 Yellow  Straw, Yellow, Light-Yellow Final    POC Appearance, Urine 01/09/2025 Clear  Clear Final    POC Glucose, Urine 01/09/2025 NEGATIVE  NEGATIVE mg/dl Final    POC Bilirubin, Urine 01/09/2025 NEGATIVE  NEGATIVE  Final    POC Ketones, Urine 01/09/2025 NEGATIVE  NEGATIVE mg/dl Final    POC Specific Gravity, Urine 01/09/2025 1.010  1.005 - 1.035 Final    POC Blood, Urine 01/09/2025 NEGATIVE  NEGATIVE Final    POC PH, Urine 01/09/2025 6.0  No Reference Range Established PH Final    POC Protein, Urine 01/09/2025 NEGATIVE  NEGATIVE mg/dl Final    POC Urobilinogen, Urine 01/09/2025 0.2  0.2, 1.0 EU/DL Final    Poc Nitrite, Urine 01/09/2025 NEGATIVE  NEGATIVE Final    POC Leukocytes, Urine 01/09/2025 NEGATIVE  NEGATIVE Final     === 11/20/24 ===    US RENAL COMPLETE    - Impression -  Prostatomegaly with findings concerning for urinary retention.  Correlation with PSA is recommended.    Nonspecific 0.6 cm hyperechoic lesion in the right hepatic lobe  likely representing a hepatic hemangioma.    MACRO:  None    Signed by: Lobito Tracy 11/21/2024 6:21 PM  Dictation workstation:   [id number]    PVR=47ml      Assessment/Plan   Diagnoses and all orders for this visit:  Nocturia  -     POCT UA Automated manually resulted  -     Post-Void Residual    We discussed lifestyle modifications to help to manage his symptoms including decreasing caffeine intake and minimizing other fluids to no more than 8 ounces per hour.  I recommended he continue on the doxazosin as this will also be beneficial for management of his blood pressure.  We discussed addition of tadalafil and intention with this treatment.  He will consider and let me know if he would like to proceed.  If he does we will follow-up a few months later.  Patient verbalized understanding of plan.           LINSEY Foreman-CNP 01/09/25 11:23 AM

## 2025-01-09 NOTE — PATIENT INSTRUCTIONS
Minimize caffeine, consider decreasing to 1 cup  Moderate intake of fluids, 8 oz/hour  Stop fluids 2 hours before bed    Dapsone Counseling: I discussed with the patient the risks of dapsone including but not limited to hemolytic anemia, agranulocytosis, rashes, methemoglobinemia, kidney failure, peripheral neuropathy, headaches, GI upset, and liver toxicity.  Patients who start dapsone require monitoring including baseline LFTs and weekly CBCs for the first month, then every month thereafter.  The patient verbalized understanding of the proper use and possible adverse effects of dapsone.  All of the patient's questions and concerns were addressed.

## 2025-01-14 ENCOUNTER — HOSPITAL ENCOUNTER (OUTPATIENT)
Dept: RADIOLOGY | Facility: HOSPITAL | Age: 70
Discharge: HOME | End: 2025-01-14
Payer: MEDICARE

## 2025-01-14 PROCEDURE — 76705 ECHO EXAM OF ABDOMEN: CPT | Performed by: STUDENT IN AN ORGANIZED HEALTH CARE EDUCATION/TRAINING PROGRAM

## 2025-01-14 PROCEDURE — 76705 ECHO EXAM OF ABDOMEN: CPT

## 2025-01-15 ENCOUNTER — TELEPHONE (OUTPATIENT)
Dept: PRIMARY CARE | Facility: CLINIC | Age: 70
End: 2025-01-15
Payer: MEDICARE

## 2025-01-15 NOTE — TELEPHONE ENCOUNTER
----- Message from Betr Deani sent at 1/15/2025  9:18 AM EST -----  Ultrasound shows there is a gallbladder polyp which is small and as per radiologist it is benign.  The small liver abnormal area is probably blood vessels.  We will further review and discuss the management options when he follows up.  Please advise patient to keep the follow-up appointment.

## 2025-01-28 ENCOUNTER — APPOINTMENT (OUTPATIENT)
Dept: UROLOGY | Facility: CLINIC | Age: 70
End: 2025-01-28
Payer: MEDICARE

## 2025-02-03 ENCOUNTER — APPOINTMENT (OUTPATIENT)
Dept: CARDIOLOGY | Facility: CLINIC | Age: 70
End: 2025-02-03
Payer: MEDICARE

## 2025-02-11 ENCOUNTER — APPOINTMENT (OUTPATIENT)
Dept: PRIMARY CARE | Facility: CLINIC | Age: 70
End: 2025-02-11
Payer: MEDICARE

## 2025-02-11 ENCOUNTER — PATIENT MESSAGE (OUTPATIENT)
Dept: PRIMARY CARE | Facility: CLINIC | Age: 70
End: 2025-02-11

## 2025-02-11 VITALS
DIASTOLIC BLOOD PRESSURE: 83 MMHG | WEIGHT: 225 LBS | BODY MASS INDEX: 33.33 KG/M2 | HEIGHT: 69 IN | HEART RATE: 78 BPM | SYSTOLIC BLOOD PRESSURE: 147 MMHG

## 2025-02-11 DIAGNOSIS — Z13.6 SCREENING FOR ISCHEMIC HEART DISEASE: ICD-10-CM

## 2025-02-11 DIAGNOSIS — M17.10 ARTHRITIS OF KNEE: ICD-10-CM

## 2025-02-11 DIAGNOSIS — R17 ELEVATED BILIRUBIN: ICD-10-CM

## 2025-02-11 DIAGNOSIS — Z12.11 ENCOUNTER FOR SCREENING FOR MALIGNANT NEOPLASM OF COLON: ICD-10-CM

## 2025-02-11 DIAGNOSIS — E55.9 VITAMIN D INSUFFICIENCY: ICD-10-CM

## 2025-02-11 DIAGNOSIS — E78.2 MIXED HYPERLIPIDEMIA: ICD-10-CM

## 2025-02-11 DIAGNOSIS — I10 BENIGN ESSENTIAL HYPERTENSION: ICD-10-CM

## 2025-02-11 DIAGNOSIS — E78.5 HYPERLIPIDEMIA, UNSPECIFIED HYPERLIPIDEMIA TYPE: ICD-10-CM

## 2025-02-11 DIAGNOSIS — R93.2 ABNORMAL LIVER ULTRASOUND: ICD-10-CM

## 2025-02-11 DIAGNOSIS — E55.9 VITAMIN D DEFICIENCY: ICD-10-CM

## 2025-02-11 DIAGNOSIS — R73.9 HYPERGLYCEMIA: ICD-10-CM

## 2025-02-11 DIAGNOSIS — M25.562 LEFT KNEE PAIN, UNSPECIFIED CHRONICITY: ICD-10-CM

## 2025-02-11 DIAGNOSIS — Z00.00 ROUTINE GENERAL MEDICAL EXAMINATION AT HEALTH CARE FACILITY: Primary | ICD-10-CM

## 2025-02-11 PROBLEM — M13.861 ALLERGIC ARTHRITIS OF RIGHT KNEE: Status: ACTIVE | Noted: 2025-02-11

## 2025-02-11 PROCEDURE — 1160F RVW MEDS BY RX/DR IN RCRD: CPT | Performed by: INTERNAL MEDICINE

## 2025-02-11 PROCEDURE — 1036F TOBACCO NON-USER: CPT | Performed by: INTERNAL MEDICINE

## 2025-02-11 PROCEDURE — 99213 OFFICE O/P EST LOW 20 MIN: CPT | Performed by: INTERNAL MEDICINE

## 2025-02-11 PROCEDURE — 1158F ADVNC CARE PLAN TLK DOCD: CPT | Performed by: INTERNAL MEDICINE

## 2025-02-11 PROCEDURE — 1170F FXNL STATUS ASSESSED: CPT | Performed by: INTERNAL MEDICINE

## 2025-02-11 PROCEDURE — G0439 PPPS, SUBSEQ VISIT: HCPCS | Performed by: INTERNAL MEDICINE

## 2025-02-11 PROCEDURE — 1159F MED LIST DOCD IN RCRD: CPT | Performed by: INTERNAL MEDICINE

## 2025-02-11 PROCEDURE — 3008F BODY MASS INDEX DOCD: CPT | Performed by: INTERNAL MEDICINE

## 2025-02-11 PROCEDURE — 1123F ACP DISCUSS/DSCN MKR DOCD: CPT | Performed by: INTERNAL MEDICINE

## 2025-02-11 PROCEDURE — 3079F DIAST BP 80-89 MM HG: CPT | Performed by: INTERNAL MEDICINE

## 2025-02-11 PROCEDURE — G0446 INTENS BEHAVE THER CARDIO DX: HCPCS | Performed by: INTERNAL MEDICINE

## 2025-02-11 PROCEDURE — 3077F SYST BP >= 140 MM HG: CPT | Performed by: INTERNAL MEDICINE

## 2025-02-11 PROCEDURE — 99497 ADVNCD CARE PLAN 30 MIN: CPT | Performed by: INTERNAL MEDICINE

## 2025-02-11 ASSESSMENT — ACTIVITIES OF DAILY LIVING (ADL)
BATHING: INDEPENDENT
TAKING_MEDICATION: INDEPENDENT
DOING_HOUSEWORK: INDEPENDENT
DRESSING: INDEPENDENT
GROCERY_SHOPPING: INDEPENDENT
MANAGING_FINANCES: INDEPENDENT

## 2025-02-11 ASSESSMENT — PATIENT HEALTH QUESTIONNAIRE - PHQ9
SUM OF ALL RESPONSES TO PHQ9 QUESTIONS 1 AND 2: 0
2. FEELING DOWN, DEPRESSED OR HOPELESS: NOT AT ALL
1. LITTLE INTEREST OR PLEASURE IN DOING THINGS: NOT AT ALL

## 2025-02-11 ASSESSMENT — LIFESTYLE VARIABLES
HOW OFTEN DO YOU HAVE SIX OR MORE DRINKS ON ONE OCCASION: NEVER
SKIP TO QUESTIONS 9-10: 1
HAS A RELATIVE, FRIEND, DOCTOR, OR ANOTHER HEALTH PROFESSIONAL EXPRESSED CONCERN ABOUT YOUR DRINKING OR SUGGESTED YOU CUT DOWN: NO
HOW MANY STANDARD DRINKS CONTAINING ALCOHOL DO YOU HAVE ON A TYPICAL DAY: 1 OR 2
AUDIT TOTAL SCORE: 1
HOW OFTEN DO YOU HAVE A DRINK CONTAINING ALCOHOL: MONTHLY OR LESS
AUDIT-C TOTAL SCORE: 1
HAVE YOU OR SOMEONE ELSE BEEN INJURED AS A RESULT OF YOUR DRINKING: NO

## 2025-02-11 NOTE — PROGRESS NOTES
"Subjective :  Chief Complaint: Thomas A Berardinelli is an 69 y.o. male here for an annual wellness visit and general medical care and f/u.     HPI:  69 years old male who is generally active came for a follow-up visit.  He says that he is doing men's health blood work from his son's lab 3-4 times a year.  He says he did not forward the last report to me.  He is tolerating his medications well.    He had seen the urologist and he says he is BPH symptoms are stable.    Patient complains of bilateral joint pains mainly in the knees.  He says he has had 2 accidental falls in the snow because of this knee problem.  He would like to have a handicap parking permit.  He says that he is trying to use the brace for the knees.    He has had the ultrasound of the liver done and he would like to review the results which is discussed in detail.    He is trying to be active.    Patient denies any nausea vomiting no fever no chills.  He denies any alcohol abuse.    1.  Immunizations refuses all vaccines  Prevnar 20,   shingles,   Tdap,   RSV,   FLU  COVID    2.  Colon screening cologuard ordered    3.  Eye exam referred    4.  Hearing test ok    5.  Prostate 11/2024 3.43,     6.  Living will discussed    7.  Fall prevention discussed    8.  Bone density n/a    9.  Weight and gait    10.  Skin care and dermatology discussed. refueses to see a dermatologist at this time    11. DM  Foot exam  eye exam  HbA1C  GFR    12. CAD  Cholestrol  Risk calculation  Calcium score    13. Lung screenig n/a    14. Depression screen reviewed    15. Blood Screen deferred.        All systems reviewed and negative except for what was mentioned in the HPI    Objective   /83 (BP Location: Left arm, Patient Position: Sitting, BP Cuff Size: Adult)   Pulse 78   Ht 1.746 m (5' 8.75\")   Wt 102 kg (225 lb)   BMI 33.47 kg/m²       PHYSICAL EXAM   Normal-built, well-nourished  with no apparent distress. Alert oriented  Mood is appropriate, memory is " intact  Hearing is good  Skin:  Normal turgor.  No rash.  Head:  Normocephalic, atraumatic.  Eyes:  Pupils are equal, round,.  No pallor of conjunctivae.  Mouth has moist oral mucosa.  Neck:  Supple.  No JVD.  No carotid bruit.  No thyromegaly. No cervical lymphadenopathy.  No clubbing  Chest:  Vesicular breathing Bilaterally good air entry and bilaterally clear to auscultation.  No wheezing.  No crackles.  Heart:  Regular rate and rhythm.  S1, S2 positive.  No murmur.  Abdomen:  Soft and nontender.  Bowel sounds are positive.  No organomegaly.  Extremities:, bilaterally no pedal pitting edema.  Bilaterally 2+ dorsalis pedis pulses.  No calf tenderness. Homans sign is negative.  Neuro Exam: No focal signs. Gait is normal.  Imaging:  US abdomen limited liver    Result Date: 1/15/2025  Interpreted By:  Nirav Ac, STUDY: US ABDOMEN LIMITED LIVER;  1/14/2025 12:10 pm   INDICATION: Signs/Symptoms:LIVER hemangioma, pineda bilirubin.   ,R17 Unspecified jaundice,D18.03 Hemangioma of intra-abdominal structures   COMPARISON: Renal ultrasound 11/20/2024   ACCESSION NUMBER(S): [id number]   ORDERING CLINICIAN: JOE HAWK   TECHNIQUE: Multiple images of the right upper quadrant were obtained.   FINDINGS: LIVER: The liver measures 17.3 cm. Normal echogenicity. Left hepatic lobe simple cyst measuring 1.3 cm. Right hepatic lobe echogenic focus measuring 0.7 cm.     GALLBLADDER: The gallbladder is nondistended, and demonstrates no evidence of gallstones, wall thickening or surrounding fluid. The gallbladder wall thickness is 0.3 cm. Sonographic Christianson's sign is negative. Nonshadowing gallbladder wall nodule measuring 0.4 cm.     BILE DUCTS: No evidence of intra or extrahepatic biliary dilatation is identified; the common bile duct measures 0.4 cm.   PANCREAS: The visualized pancreas is unremarkable in appearance.   RIGHT KIDNEY: The right kidney measures 12.9 cm in length. The renal cortical echogenicity and thickness are within  normal limit.  No hydronephrosis or renal calculi are seen.       1. Right hepatic lobe echogenic focus measuring 0.7 cm. This finding statistically would represent hemangioma in the absence of cirrhosis or primary malignancy, however further assessment by dedicated liver MRI could be considered for definite characterization or alternatively follow-up in 6-12 months by ultrasound to ensure stability (no prior images available for comparison). 2. Gallbladder wall polyp measuring 0.4 cm likely benign.   MACRO: None   Signed by: Nirav Ac 1/15/2025 6:42 AM Dictation workstation:   [id number]       Labs reviewed:    Lab Results   Component Value Date    WBC 7.3 11/19/2024    HGB 14.3 11/19/2024    HCT 44.3 11/19/2024     11/19/2024    CHOL 219 (H) 11/19/2024    TRIG 128 11/19/2024    HDL 69.2 11/19/2024    ALT 19 11/19/2024    AST 20 11/19/2024     11/19/2024    K 4.6 11/19/2024     11/19/2024    CREATININE 0.77 11/19/2024    BUN 9 11/19/2024    CO2 30 11/19/2024    TSH 2.36 01/13/2023    HGBA1C 4.7 11/19/2024       Past Medical, Surgical, and Family History reviewed and updated in chart.    I have reviewed and reconciled the medication list with the patient today.   Current Outpatient Medications:     doxazosin (Cardura) 2 mg tablet, Take 1 tablet (2 mg) by mouth 2 times a day., Disp: 60 tablet, Rfl: 5    multivitamin with minerals iron-free (Centrum Silver), Take 1 tablet by mouth once daily., Disp: , Rfl:     lisinopriL-hydrochlorothiazide 10-12.5 mg tablet, Take 1 tablet by mouth once daily. (Patient not taking: Reported on 2/11/2025), Disp: 30 tablet, Rfl: 3     List of current healthcare providers:  Patient Care Team:  Bert Lake MD as PCP - General (Internal Medicine)     HRA:     Over the past 2 weeks, how often have you been bothered by any of the following problems?  Little interest or pleasure in doing things: Not at all  Feeling down, depressed, or hopeless: Not at all  Patient  Health Questionnaire-2 Score: 0     Audit Alcohol Screening  Q1: How often do you have a drink containing alcohol?: Monthly or less  Q2: How many drinks containing alcohol do you have on a typical day when you are drinking?: 1 or 2  Q3: How often do you have six or more drinks on one occasion?: Never  Audit-C Score: 1     Nutrition and Exercise  Current Diet: Well Balanced Diet  Adequate Fluid Intake: Yes  Caffeine: Yes  Exercise Frequency: Regularly  Home Safety Risk Factors: Loose rugs  Functional Ability/Level of Safety  Cognitive Impairment Observed: No cognitive impairment observed  Patient Self Assessment of Health Status  Patient Self Assessment: Excellent    Assessment/Plan :  69 years old male with a history of hypertension is tolerating his blood pressure medications well and will be continued and his blood pressure will be followed up which is borderline controlled at this time.  Patient has lower leg tendency and taking the lisinopril but the importance of taking the lisinopril was discussed.  He is taking the Cardura and his urine symptoms are stable.  Patient's ultrasound of the liver shows questionable abnormalities and it could be a hemangioma.  There is also gallbladder polyp.  As per the radiologist it is a benign appearing.  I advised the patient to see a hepatologist to have further review and discussions.    Because of his risk factors it was decided to do the CT coronary artery calcium score as a screening test for coronary artery disease.  The benefits and possible incidental findings when doing the CT testing was discussed in detail.  Patient understands that if there is any incidental findings which needs to be further investigated he may need to come back to the office for the investigation and discussions.  10 minutes were spent assessing and discussing cardiovascular risk was and, if needed, lifestyle modifications recommended, including nutritional choices, exercise, and elimination of  habits contributing to risk.  At this time it was decided that patient does not need aspirin.    As per patient's request a handicap parking permit was given to the patient because of his bilateral knee arthritis but he does not want any intervention and he will use the brace and other conservative measures.    Patient's bilirubin level will be followed up with his blood test from his son who runs a lab.    The importance of cholesterol control was discussed and patient will have the lifestyle modification and his cholesterol numbers will be followed up.      Problem List Items Addressed This Visit       Benign essential hypertension                   Hyperglycemia    Relevant Orders    Hemoglobin A1C    Hyperlipidemia                   Relevant Orders    Lipid Panel    TSH with reflex to Free T4 if abnormal    Comprehensive Metabolic Panel    Left knee pain                   Vitamin D insufficiency                   Relevant Orders    Vitamin D 25-Hydroxy,Total (for eval of Vitamin D levels)    Elevated bilirubin                   Abnormal liver ultrasound                   Relevant Orders    Referral to Hepatology    CBC and Auto Differential    Comprehensive Metabolic Panel    Arthritis of knee                   Relevant Orders    Disability Placard    Routine general medical examination at health care facility - Primary       Orders:    1 Year Follow Up In Primary Care - Wellness Exam; Future           Relevant Orders    1 Year Follow Up In Primary Care - Wellness Exam    Cologuard® colon cancer screening     Other Visit Diagnoses       Vitamin D deficiency        Screening for ischemic heart disease        Relevant Orders    CT cardiac scoring wo IV contrast    Encounter for screening for malignant neoplasm of colon        Relevant Orders    Cologuard® colon cancer screening          The following health maintenance schedule was reviewed with the patient and provided in printed form in the after visit  summary:  Health Maintenance   Topic Date Due    Hepatitis C Screening  Never done    Hepatitis A Vaccines (1 of 2 - Risk 2-dose series) Never done    DTaP/Tdap/Td Vaccines (1 - Tdap) Never done    Zoster Vaccines (1 of 2) Never done    RSV High Risk: (Elderly (60+) or Pregnant Population) (1 - Risk 60-74 years 1-dose series) Never done    Hepatitis B Vaccines (1 of 3 - Risk 3-dose series) Never done    Pneumococcal Vaccine (2 of 2 - PCV) 10/01/2022    Influenza Vaccine (1) Never done    COVID-19 Vaccine (1 - 2024-25 season) Never done    Colorectal Cancer Screening  12/16/2024    Medicare Annual Wellness Visit (AWV)  02/12/2026    Lipid Panel  11/19/2029    HIB Vaccines  Aged Out    IPV Vaccines  Aged Out    Meningococcal Vaccine  Aged Out    Rotavirus Vaccines  Aged Out    HPV Vaccines  Aged Out    Irritable Bowel Syndrome  Discontinued       Advance Care Planning   Advance Care Planning Note     Discussion Date: 02/11/25   Discussion Participants: patient    The patient wishes to discuss Advance Care Planning today and the following is a brief summary of our discussion.     Patient has capacity to make their own medical decisions: Yes  Health Care Agent/Surrogate Decision Maker documented in chart: No    Documents on file and valid:  Advance Directive/Living Will: Yes   Health Care Power of : Yes  Other: Patient will bring the papers to us    Communication of Medical Status/Prognosis:   Fair    Communication of Treatment Goals/Options:   Discussed in detail.    Treatment Decisions  Goals of Care: goals of care were unable to be ascertained, follow plan as below   Patient wishes to be full code but details will be followed during his next visit  Follow Up Plan  Patient will discuss with his son who runs a lab and will further follow-up with us with the paperwork's  Team Members  Son  Time Statement: Total face to face time spent on advance care planning was 16 minutes with 11 minutes spent in counseling,  including the explanation.    Bert Lake MD  2/11/2025 5:06 PM    Orders Placed This Encounter   Procedures    Disability Placard     Order Specific Question:   Reason     Answer:   knee arthritis     Order Specific Question:   Duration     Answer:   5 Years    CT cardiac scoring wo IV contrast     Standing Status:   Future     Standing Expiration Date:   2/11/2026     Order Specific Question:   Reason for exam:     Answer:   screening     Order Specific Question:   Radiologist to Determine Optimal Study     Answer:   Yes     Order Specific Question:   Release result to MyChart     Answer:   Immediate [1]     Order Specific Question:   Is this exam part of a Research Study? If Yes, link this order to the research study     Answer:   No    CBC and Auto Differential     Standing Status:   Future     Standing Expiration Date:   2/11/2026     Order Specific Question:   Release result to MyChart     Answer:   Immediate    Comprehensive Metabolic Panel     Standing Status:   Future     Standing Expiration Date:   2/11/2026     Order Specific Question:   Release result to MyChart     Answer:   Immediate    Lipid Panel     fasting     Standing Status:   Future     Standing Expiration Date:   2/11/2026     Order Specific Question:   Release result to MyChart     Answer:   Immediate [1]    TSH with reflex to Free T4 if abnormal     Standing Status:   Future     Standing Expiration Date:   2/11/2026     Order Specific Question:   Release result to MyChart     Answer:   Immediate    Vitamin D 25-Hydroxy,Total (for eval of Vitamin D levels)     Standing Status:   Future     Standing Expiration Date:   2/11/2026     Order Specific Question:   Release result to MyChart     Answer:   Immediate    Hemoglobin A1C     Standing Status:   Future     Standing Expiration Date:   2/11/2026     Order Specific Question:   Release result to MyChart     Answer:   Immediate    Cologuard® colon cancer screening     Standing Status:   Future      Number of Occurrences:   1     Standing Expiration Date:   2/11/2026     Order Specific Question:   Release result to Apprion     Answer:   Immediate [1]    Referral to Hepatology     Standing Status:   Future     Standing Expiration Date:   2/11/2026     Referral Priority:   Routine     Referral Type:   Consultation     Referral Reason:   Specialty Services Required     Requested Specialty:   Hepatology     Number of Visits Requested:   1       Continue current medications as listed  Follow up in 3 months   Patient was identified as a fall risk. Risk prevention instructions provided.

## 2025-02-24 ENCOUNTER — APPOINTMENT (OUTPATIENT)
Dept: UROLOGY | Facility: CLINIC | Age: 70
End: 2025-02-24
Payer: MEDICARE

## 2025-02-25 ENCOUNTER — OFFICE VISIT (OUTPATIENT)
Dept: FAMILY MEDICINE CLINIC | Age: 70
End: 2025-02-25

## 2025-02-25 VITALS
DIASTOLIC BLOOD PRESSURE: 76 MMHG | OXYGEN SATURATION: 96 % | HEIGHT: 70 IN | BODY MASS INDEX: 32.35 KG/M2 | TEMPERATURE: 97.2 F | WEIGHT: 226 LBS | SYSTOLIC BLOOD PRESSURE: 146 MMHG | HEART RATE: 78 BPM

## 2025-02-25 DIAGNOSIS — T14.8XXA NON-HEALING NON-SURGICAL WOUND: Primary | ICD-10-CM

## 2025-02-25 PROBLEM — M22.42 CHONDROMALACIA OF LEFT PATELLA: Status: ACTIVE | Noted: 2023-06-12

## 2025-02-25 PROBLEM — R35.1 NOCTURIA: Status: ACTIVE | Noted: 2023-06-12

## 2025-02-25 PROBLEM — I10 BENIGN ESSENTIAL HYPERTENSION: Status: ACTIVE | Noted: 2023-06-12

## 2025-02-25 PROBLEM — R39.14 BENIGN PROSTATIC HYPERPLASIA WITH INCOMPLETE BLADDER EMPTYING: Status: ACTIVE | Noted: 2024-11-22

## 2025-02-25 PROBLEM — N40.1 BENIGN PROSTATIC HYPERPLASIA WITH INCOMPLETE BLADDER EMPTYING: Status: ACTIVE | Noted: 2024-11-22

## 2025-02-25 PROBLEM — R73.9 HYPERGLYCEMIA: Status: ACTIVE | Noted: 2023-06-12

## 2025-02-25 PROBLEM — D18.03 LIVER HEMANGIOMA: Status: ACTIVE | Noted: 2024-12-17

## 2025-02-25 PROBLEM — E55.9 VITAMIN D INSUFFICIENCY: Status: ACTIVE | Noted: 2023-06-12

## 2025-02-25 PROBLEM — N20.0 NEPHROLITHIASIS: Status: ACTIVE | Noted: 2024-11-19

## 2025-02-25 PROBLEM — H61.23 BILATERAL IMPACTED CERUMEN: Status: ACTIVE | Noted: 2023-06-12

## 2025-02-25 PROBLEM — R93.2 ABNORMAL LIVER ULTRASOUND: Status: ACTIVE | Noted: 2025-02-11

## 2025-02-25 PROBLEM — M17.10 ARTHRITIS OF KNEE: Status: ACTIVE | Noted: 2025-02-11

## 2025-02-25 PROBLEM — E78.5 HYPERLIPIDEMIA: Status: ACTIVE | Noted: 2023-06-12

## 2025-02-25 PROBLEM — E80.6 HYPERBILIRUBINEMIA: Status: ACTIVE | Noted: 2023-06-12

## 2025-02-25 RX ORDER — DOXAZOSIN 2 MG/1
2 TABLET ORAL 2 TIMES DAILY
COMMUNITY
Start: 2024-12-17 | End: 2025-06-15

## 2025-02-25 SDOH — ECONOMIC STABILITY: FOOD INSECURITY: WITHIN THE PAST 12 MONTHS, THE FOOD YOU BOUGHT JUST DIDN'T LAST AND YOU DIDN'T HAVE MONEY TO GET MORE.: NEVER TRUE

## 2025-02-25 SDOH — ECONOMIC STABILITY: FOOD INSECURITY: WITHIN THE PAST 12 MONTHS, YOU WORRIED THAT YOUR FOOD WOULD RUN OUT BEFORE YOU GOT MONEY TO BUY MORE.: NEVER TRUE

## 2025-02-25 ASSESSMENT — PATIENT HEALTH QUESTIONNAIRE - PHQ9
SUM OF ALL RESPONSES TO PHQ QUESTIONS 1-9: 0
1. LITTLE INTEREST OR PLEASURE IN DOING THINGS: NOT AT ALL
SUM OF ALL RESPONSES TO PHQ QUESTIONS 1-9: 0
SUM OF ALL RESPONSES TO PHQ QUESTIONS 1-9: 0
2. FEELING DOWN, DEPRESSED OR HOPELESS: NOT AT ALL
SUM OF ALL RESPONSES TO PHQ9 QUESTIONS 1 & 2: 0
SUM OF ALL RESPONSES TO PHQ QUESTIONS 1-9: 0

## 2025-02-25 NOTE — PATIENT INSTRUCTIONS
Due to friable nature of wound shave biopsy was elected for diagnostic purposes today.    Incision/Laceration repair    -Clean surgical area with antibacterial soap and water once daily.      --You may be instructed to soak the wound with Hydrogen Peroxide to loosen scabbing around sutures, this is not to be done more often that every 3 days, should be for 30 seconds-1 min and then rinsed off with water.     -Keep surgical site moist with vaseline or antibiotic ointment (single- Bacitracin, not triple antibiotic or Neosporin) and apply a fresh bandage daily until a solid scab forms or if the wound is at risk for trauma or dirt. It is important to leave the wound uncovered part of the day to allow the skin to dry and avoid breakdown from excessive moisture. There may be exceptions to this, the staff will provide information if your wound requires a variation in this, that will often involve a prescription ointment or cream.     -Follow up immediately if any growing redness (minimal redness or pale pink is normal along wound edges) surrounds the surgical site or if dripping drainage occurs at surgical site. Once a solid scab forms no more bandage needed. A wet scab can look yellow.  This is not infection, just moisture.    -Once the lesion is healed be sure to apply sunscreen to the area to prevent burn of the newer and more delicate skin especially during the first 6 months of healing.        -If the scar begins to be raised you may massage the area firmly twice a day to help break down scar tissue and help the area become a flat scar. There is some evidence that Mederma applied to a scar daily for the first few months can help shrink and fade it more quickly then without intervention.    [place] [employer] - Financial Resources*  (Call United Way/[employer] if need more resources.)  UTILITY:         [employer]:  What they offer: Help find lower cost options for phone or internet as well as help paying utility bills.

## 2025-02-25 NOTE — PROGRESS NOTES
{No diagnosis found. (Refresh or delete this SmartLink)}  No follow-ups on file.  Patient Instructions      [place] [employer] - Financial Resources*  (Call United Way/211 if need more resources.)  UTILITY:         [address]:  What they offer: Help find lower cost options for phone or internet as well as help paying utility bills.   Phone Number: 211  Website: https://www.C[employer]/get-help/utilities-expenses   [employer]  What they offer:  Assistance with utilities  Phone:  526.740.1424    [facility]   What they Offer: Assistance with utilities through the Home Energy Assistance Program (HEAP) and the Winter Crisis Program (WCP), also sometimes called Emergency HEAP (E-HEAP).  These programs are designed to help income-eligible consumers with winter heating costs.  Also assists with the application for Percentage of Income Payment Plan (PIPP) which allows eligible residents to reduce their energy bills to a percentage of their income and avoid crisis situations.  Phone: 281.768.1506  Website: https://www.[employer]/    [employer] [place]  What they Offer: Assistance with utilities  Phone: 739.669.8084  Website: https://[facility]rix[facility] Hospitaliance.org/    [employer]   What they Offer: Assistance with utilities and rent   Phone: 220.849.9614   Website: www.MyDeals.come.org   Address:  [address], [place] [address]      [facility] Assistance Network ([facility])   What they Offer: Provides the residents of [facility], with year-round access to emergency financial assistance for rent, utilities, or other emergency basic needs--at a single site geographically located in your neighborhood of residence   Website: C[employer] Online Application      [place] [facility]    What they offer: Utility payment assistance, utility payment plans, utility bill assistance, home energy conservation and winter/summer crisis programs.   Phone: [phone]    Fax: [phone] 
occurs at surgical site. Once a solid scab forms no more bandage needed. A wet scab can look yellow.  This is not infection, just moisture.    -Once the lesion is healed be sure to apply sunscreen to the area to prevent burn of the newer and more delicate skin especially during the first 6 months of healing.        -If the scar begins to be raised you may massage the area firmly twice a day to help break down scar tissue and help the area become a flat scar. There is some evidence that Mederma applied to a scar daily for the first few months can help shrink and fade it more quickly then without intervention.    [place] [employer] - Financial Resources*  (Call United Way/[employer] if need more resources.)  UTILITY:         [employer]:  What they offer: Help find lower cost options for phone or internet as well as help paying utility bills.   Phone Number: 211  Website: https://[employer]/get-help/utilities-expenses   [employer]  What they offer:  Assistance with utilities  Phone:  421.880.9729    [facility]   What they Offer: Assistance with utilities through the Home Energy Assistance Program (HEAP) and the Winter Crisis Program (WCP), also sometimes called Emergency HEAP (E-HEAP).  These programs are designed to help income-eligible consumers with winter heating costs.  Also assists with the application for Percentage of Income Payment Plan (PIPP) which allows eligible residents to reduce their energy bills to a percentage of their income and avoid crisis situations.  Phone: 698.248.7275  Website: https://www.Spotplexa.net/    [employer] [place]  What they Offer: Assistance with utilities  Phone: 473.709.6229  Website: https://[facility] FootprintSelect [facility] Specialty [facility] Columbusiance.org/    [employer]   What they Offer: Assistance with utilities and rent   Phone: 113.473.8858   Website: www."[employer]."[facility] Hospitale.org   Address:  [address], [place] [address]      [place] [facility] [employer] [facility] ([facility])   What they Offer:

## 2025-02-28 LAB — NONINV COLON CA DNA+OCC BLD SCRN STL QL: NEGATIVE

## 2025-03-10 ENCOUNTER — RESULTS FOLLOW-UP (OUTPATIENT)
Dept: FAMILY MEDICINE CLINIC | Age: 70
End: 2025-03-10

## 2025-03-10 NOTE — RESULT ENCOUNTER NOTE
LMTCB, please notify pt of recommendations if he calls back. If you are unsure about recommendations, please let me know and I can discuss w/ pt.

## 2025-03-20 NOTE — RESULT ENCOUNTER NOTE
Yes he can have it done by any provider that does the Mohs procedure.  External Mohs would be the likely option

## 2025-03-20 NOTE — TELEPHONE ENCOUNTER
Pt calling and wondering if he can do the Moh's procedure through ?     He has mostly  providers and he would like them to follow through, is this possible?     Also: Moh's might not have been ordered.     Please advise. Is \"external moh's\" an option?

## 2025-03-21 NOTE — RESULT ENCOUNTER NOTE
I need assistance with placing the order for the Moh's... Is there an external order to place for this?    Please advise.

## 2025-03-24 DIAGNOSIS — C44.91 BASAL CELL CARCINOMA (BCC), UNSPECIFIED SITE: Primary | ICD-10-CM

## 2025-03-25 ENCOUNTER — TELEPHONE (OUTPATIENT)
Dept: PRIMARY CARE | Facility: CLINIC | Age: 70
End: 2025-03-25
Payer: MEDICARE

## 2025-03-25 NOTE — TELEPHONE ENCOUNTER
Pt calling and would like that referral asap please.  He wants to get this done sooner than later.  And needs Jaime to get that done for him please.

## 2025-03-28 ENCOUNTER — HOSPITAL ENCOUNTER (OUTPATIENT)
Dept: RADIOLOGY | Facility: HOSPITAL | Age: 70
Discharge: HOME | End: 2025-03-28
Payer: MEDICARE

## 2025-03-28 DIAGNOSIS — Z13.6 SCREENING FOR ISCHEMIC HEART DISEASE: ICD-10-CM

## 2025-03-28 PROCEDURE — 75571 CT HRT W/O DYE W/CA TEST: CPT

## 2025-03-31 ENCOUNTER — OFFICE VISIT (OUTPATIENT)
Dept: GASTROENTEROLOGY | Facility: CLINIC | Age: 70
End: 2025-03-31
Payer: MEDICARE

## 2025-03-31 VITALS
WEIGHT: 225.4 LBS | HEART RATE: 84 BPM | SYSTOLIC BLOOD PRESSURE: 160 MMHG | BODY MASS INDEX: 33.38 KG/M2 | HEIGHT: 69 IN | TEMPERATURE: 98.9 F | DIASTOLIC BLOOD PRESSURE: 112 MMHG

## 2025-03-31 DIAGNOSIS — R93.2 ABNORMAL LIVER ULTRASOUND: ICD-10-CM

## 2025-03-31 DIAGNOSIS — K76.0 FATTY (CHANGE OF) LIVER, NOT ELSEWHERE CLASSIFIED: ICD-10-CM

## 2025-03-31 DIAGNOSIS — E80.4 GILBERT'S DISEASE: Primary | ICD-10-CM

## 2025-03-31 PROCEDURE — G2211 COMPLEX E/M VISIT ADD ON: HCPCS | Performed by: NURSE PRACTITIONER

## 2025-03-31 PROCEDURE — 1159F MED LIST DOCD IN RCRD: CPT | Performed by: NURSE PRACTITIONER

## 2025-03-31 PROCEDURE — 3008F BODY MASS INDEX DOCD: CPT | Performed by: NURSE PRACTITIONER

## 2025-03-31 PROCEDURE — 99203 OFFICE O/P NEW LOW 30 MIN: CPT | Performed by: NURSE PRACTITIONER

## 2025-03-31 PROCEDURE — 3080F DIAST BP >= 90 MM HG: CPT | Performed by: NURSE PRACTITIONER

## 2025-03-31 PROCEDURE — 1126F AMNT PAIN NOTED NONE PRSNT: CPT | Performed by: NURSE PRACTITIONER

## 2025-03-31 PROCEDURE — 1160F RVW MEDS BY RX/DR IN RCRD: CPT | Performed by: NURSE PRACTITIONER

## 2025-03-31 PROCEDURE — 1036F TOBACCO NON-USER: CPT | Performed by: NURSE PRACTITIONER

## 2025-03-31 PROCEDURE — 1123F ACP DISCUSS/DSCN MKR DOCD: CPT | Performed by: NURSE PRACTITIONER

## 2025-03-31 PROCEDURE — 3077F SYST BP >= 140 MM HG: CPT | Performed by: NURSE PRACTITIONER

## 2025-03-31 PROCEDURE — 99213 OFFICE O/P EST LOW 20 MIN: CPT | Performed by: NURSE PRACTITIONER

## 2025-03-31 ASSESSMENT — ENCOUNTER SYMPTOMS
DIARRHEA: 0
COUGH: 0
VOMITING: 0
COLOR CHANGE: 0
FREQUENCY: 0
AGITATION: 0
TREMORS: 0
SHORTNESS OF BREATH: 0
APPETITE CHANGE: 0
VOICE CHANGE: 0
LIGHT-HEADEDNESS: 0
HEMATURIA: 0
CHILLS: 0
BLOOD IN STOOL: 0
DIFFICULTY URINATING: 0
ABDOMINAL DISTENTION: 0
DYSURIA: 0
CONSTIPATION: 0
BRUISES/BLEEDS EASILY: 0
TROUBLE SWALLOWING: 0
FEVER: 0
UNEXPECTED WEIGHT CHANGE: 0
HEADACHES: 0
NAUSEA: 0
SLEEP DISTURBANCE: 0
NUMBNESS: 0
DIZZINESS: 0
WHEEZING: 0
CONFUSION: 0
ABDOMINAL PAIN: 0
WEAKNESS: 0
JOINT SWELLING: 0
PALPITATIONS: 0

## 2025-03-31 ASSESSMENT — PAIN SCALES - GENERAL: PAINLEVEL_OUTOF10: 0-NO PAIN

## 2025-03-31 NOTE — ASSESSMENT & PLAN NOTE
Discussed natural history of fatty liver including risk factors and management.  Exercise/Activity  Vigorous physical activity like brisk walking or structured gym exercises 3 times a week for 30 minutes at minimum.    Resistance training to build muscle mass which will aid in weight loss.  Swimming, water aerobics are excellent forms of exercises that are easy on joints.    Exercise for 30 minutes or more at least 3 times a week  Aim to lose 7-10% of your total body weight over 6-12 months  Diet  Avoid/Limit simple carbs including simple sugars  Avoid eating foods that are rich in sugar in excess such as high sugar content fruits (pineapple, al...) and desserts, cakes and pies  Eat more good foods that are rich in good fat such as cold water fish (salmon, swordfish...) as well as avocados and peanut butter in moderation  Snack on nuts that are high in protein and good oils such as walnuts, almonds.   Eat a mediteranean diet which is rich in grilled lean meats, high protein beans and legumes and olive oil.

## 2025-03-31 NOTE — ASSESSMENT & PLAN NOTE
Gilbert syndrome a benign condition characterized by recurrent episodes of jaundice and scleral icterus.  Other than jaundice, patients are typically asymptomatic. The hyperbilirubinemia in patients with Gilbert syndrome is unconjugated. Many patients present as isolated cases but the condition can also run in families. Exacerbations may be triggered by dehydration, fasting, sleep deprivation, stress, menstruation, and overexertion.  Several studies have suggested that mildly increased serum bilirubin levels, such as those associated with Gilbert syndrome, may be beneficial because of the antioxidative, anti-inflammatory, and metabolic effects of bilirubin.    Will update labs with direct bilirubin.   COVID-19 ruled out

## 2025-03-31 NOTE — ASSESSMENT & PLAN NOTE
Right hepatic lobe echogenic focus likely hemangioma.  Will get MRI in the fall to confirm and if no other concerning features, no follow-up is needed for this finding as it is completely benign.

## 2025-03-31 NOTE — PROGRESS NOTES
Patient ID: Thomas A Berardinelli is a 69 y.o. male who presents for elevated bilirubin.    HPI  69 year old history of HTN (not taking prescribed meds) referred for elevated bilirubin and abnormal ultrasound.  He has had elevated bilirubin for a few years ~ 1.8.    US of abdomen 02/2025 Right hepatic lobe echogenic focus measuring 0.7 cm. This finding statistically would represent hemangioma in the absence of cirrhosis or primary malignancy.  He has been told in the past he likely had Gilbert's syndrome.     Asymptomatic.    Weight has been stable.  He is active.    Denies jaundice, icterus, itching, abdominal distension, edema, bleeding or confusion.    Denies fevers, chills, abdominal pain.         Review of Systems   Constitutional:  Negative for appetite change, chills, fever and unexpected weight change.   HENT:  Negative for mouth sores, nosebleeds, trouble swallowing and voice change.    Eyes:  Negative for visual disturbance.   Respiratory:  Negative for cough, shortness of breath and wheezing.    Cardiovascular:  Negative for chest pain, palpitations and leg swelling.   Gastrointestinal:  Negative for abdominal distention, abdominal pain, blood in stool, constipation, diarrhea, nausea and vomiting.   Genitourinary:  Negative for decreased urine volume, difficulty urinating, dysuria, frequency, hematuria and urgency.   Musculoskeletal:  Negative for gait problem and joint swelling.   Skin:  Negative for color change, pallor and rash.   Neurological:  Negative for dizziness, tremors, weakness, light-headedness, numbness and headaches.   Hematological:  Does not bruise/bleed easily.   Psychiatric/Behavioral:  Negative for agitation, behavioral problems, confusion and sleep disturbance.        Objective   Physical Exam  Constitutional:       General: He is awake.      Appearance: Normal appearance. He is well-developed.   HENT:      Head: Normocephalic and atraumatic.      Right Ear: Hearing normal.      Left  Ear: Hearing normal.      Nose: Nose normal.      Mouth/Throat:      Lips: Pink.      Mouth: Mucous membranes are moist.   Eyes:      General: Lids are normal.      Extraocular Movements: Extraocular movements intact.      Conjunctiva/sclera: Conjunctivae normal.      Pupils: Pupils are equal, round, and reactive to light.   Cardiovascular:      Rate and Rhythm: Normal rate and regular rhythm.      Pulses: Normal pulses.      Heart sounds: Normal heart sounds.   Pulmonary:      Effort: Pulmonary effort is normal.      Breath sounds: Normal breath sounds.   Abdominal:      General: Abdomen is flat. Bowel sounds are normal.      Palpations: Abdomen is soft.   Musculoskeletal:      Cervical back: Normal range of motion and neck supple.   Feet:      Right foot:      Skin integrity: Skin integrity normal.      Left foot:      Skin integrity: Skin integrity normal.   Skin:     General: Skin is warm.   Neurological:      General: No focal deficit present.      Mental Status: He is alert and oriented to person, place, and time.      Cranial Nerves: Cranial nerves 2-12 are intact.      Sensory: Sensation is intact.      Motor: Motor function is intact.      Coordination: Coordination is intact.      Gait: Gait is intact.   Psychiatric:         Attention and Perception: Attention and perception normal.         Mood and Affect: Mood normal.         Speech: Speech normal.         Behavior: Behavior is cooperative.         Thought Content: Thought content normal.         Cognition and Memory: Cognition normal.         Judgment: Judgment normal.         Current Outpatient Medications   Medication Sig Dispense Refill    doxazosin (Cardura) 2 mg tablet Take 1 tablet (2 mg) by mouth 2 times a day. 60 tablet 5    lisinopriL-hydrochlorothiazide 10-12.5 mg tablet Take 1 tablet by mouth once daily. (Patient not taking: Reported on 2/11/2025) 30 tablet 3    multivitamin with minerals iron-free (Centrum Silver) Take 1 tablet by mouth once  daily.       No current facility-administered medications for this visit.     LABS:   Lab Results   Component Value Date    ALBUMIN 4.5 11/19/2024    BILITOT 1.8 (H) 11/19/2024    BILIDIR 0.2 10/08/2021    ALKPHOS 67 11/19/2024    ALT 19 11/19/2024    AST 20 11/19/2024    PROT 7.4 11/19/2024      Lab Results   Component Value Date    WBC 7.3 11/19/2024    HGB 14.3 11/19/2024    HCT 44.3 11/19/2024    MCV 90 11/19/2024     11/19/2024       === 01/07/25 ===    US ABDOMEN LIMITED LIVER    - Impression -  1. Right hepatic lobe echogenic focus measuring 0.7 cm. This finding  statistically would represent hemangioma in the absence of cirrhosis  or primary malignancy, however further assessment by dedicated liver  MRI could be considered for definite characterization or  alternatively follow-up in 6-12 months by ultrasound to ensure  stability (no prior images available for comparison).  2. Gallbladder wall polyp measuring 0.4 cm likely benign.    MACRO:  None    Signed by: Nirav Ac 1/15/2025 6:42 AM  Dictation workstation:   [id number]     Assessment/Plan   Problem List Items Addressed This Visit             ICD-10-CM    Abnormal liver ultrasound R93.2     Right hepatic lobe echogenic focus likely hemangioma.  Will get MRI in the fall to confirm and if no other concerning features, no follow-up is needed for this finding as it is completely benign.         Relevant Orders    CBC and Auto Differential    Bilirubin, Direct    Comprehensive Metabolic Panel    Ferritin    Iron and TIBC    MR liver w and wo IV contrast    Fatty (change of) liver, not elsewhere classified K76.0     Discussed natural history of fatty liver including risk factors and management.  Exercise/Activity  Vigorous physical activity like brisk walking or structured gym exercises 3 times a week for 30 minutes at minimum.    Resistance training to build muscle mass which will aid in weight loss.  Swimming, water aerobics are excellent forms of  exercises that are easy on joints.    Exercise for 30 minutes or more at least 3 times a week  Aim to lose 7-10% of your total body weight over 6-12 months  Diet  Avoid/Limit simple carbs including simple sugars  Avoid eating foods that are rich in sugar in excess such as high sugar content fruits (pineapple, al...) and desserts, cakes and pies  Eat more good foods that are rich in good fat such as cold water fish (salmon, swordfish...) as well as avocados and peanut butter in moderation  Snack on nuts that are high in protein and good oils such as walnuts, almonds.   Eat a mediteranean diet which is rich in grilled lean meats, high protein beans and legumes and olive oil.                 Relevant Orders    Ferritin    Iron and TIBC    MR liver w and wo IV contrast    Gilbert's disease - Primary E80.4     Gilbert syndrome a benign condition characterized by recurrent episodes of jaundice and scleral icterus.  Other than jaundice, patients are typically asymptomatic. The hyperbilirubinemia in patients with Gilbert syndrome is unconjugated. Many patients present as isolated cases but the condition can also run in families. Exacerbations may be triggered by dehydration, fasting, sleep deprivation, stress, menstruation, and overexertion.  Several studies have suggested that mildly increased serum bilirubin levels, such as those associated with Gilbert syndrome, may be beneficial because of the antioxidative, anti-inflammatory, and metabolic effects of bilirubin.    Will update labs with direct bilirubin.         Relevant Orders    CBC and Auto Differential    Bilirubin, Direct    Comprehensive Metabolic Panel    Ferritin    Iron and TIBC            JUSTINA Potter 03/31/25 12:09 PM

## 2025-04-03 ENCOUNTER — APPOINTMENT (OUTPATIENT)
Dept: DERMATOLOGY | Facility: CLINIC | Age: 70
End: 2025-04-03
Payer: MEDICARE

## 2025-04-03 VITALS — SYSTOLIC BLOOD PRESSURE: 168 MMHG | HEART RATE: 76 BPM | DIASTOLIC BLOOD PRESSURE: 91 MMHG

## 2025-04-03 DIAGNOSIS — C44.519 BASAL CELL CARCINOMA (BCC) OF SKIN OF OTHER PART OF TORSO: ICD-10-CM

## 2025-04-03 NOTE — PROGRESS NOTES
Office Visit Note  Date: 4/3/2025  Surgeon:  Cooper Avila MD PhD  Office Location: [address]  [address]  [facility] [address]  Dept: [phone]  Dept Fax: [phone]  Referring Provider: Avelino Suárez MD  [address] [facility] [address]  [place] [address]    Subjective   Thomas A Berardinelli is a 69 y.o. male who presents for the following: WLE Surgery (Left thoracic back, BCC)    According to the patient, the lesion has been present for approximately 6 months at the time of diagnosis.  The lesion is not causing symptoms.  The lesion has not been treated previously.    The patient does not have a pacemaker / defibrillator.  The patient does not have a heart valve / joint replacement.    The patient is not on blood thinners.  The patient does not have a history of hepatitis B or C.  The patient does not have a history of HIV.  The patient does not have a history of immunosuppression (e.g. organ transplantation, malignancy, medications)    Review of Systems:  No other skin or systemic complaints other than what is documented elsewhere in the note.    MEDICAL HISTORY: clinically relevant history including significant past medical history, medications and allergies was reviewed and documented in Epic.    Objective   Well appearing patient in no apparent distress; mood and affect are within normal limits.  Vital signs: See record.  Noted on the     The patient confirmed the identified site.    Discussion:  The nature of the diagnosis was explained. The lesion is a skin cancer.  It has a risk of local growth and distant spread. The condition is associated with sun exposure.  Warning signs of non-melanoma skin cancer discussed. Patient was instructed to perform monthly self skin examination.  We recommended that the patient have regular full skin exams given an increased risk of subsequent skin cancers. The patient was instructed to use  sun protective behaviors including use of broad spectrum sunscreens and sun protective clothing to reduce risk of skin cancers.      Risks, benefits, side effects of Wide local excision vs Mohs surgery were discussed with patient and the patient voiced understanding.  Risks of surgery including but not limited to bleeding, infection, numbness, nerve damage, and scar were reviewed.  Discussion included wound care requirements, activity restrictions, likely scar outcome and time to heal.     After surgery, the defect may need to be repaired surgically and the scar may be longer than the original lesion.  Reconstruction options, risks, and benefits were reviewed including second intention healing, linear repair (4-1 ratio was explained), local flaps, skin grafts, cartilage grafts and interpolation flaps (the need for multiple surgeries was explained). Possible outcomes were reviewed including likely scar appearance, failure of flap survival, infection, bleeding and the need for revision surgery.     The pathology was reviewed, the photograph was reviewed, and the referring physician's note was reviewed.    Patient elected for MSO instead of Mohs surgery for this site as it is smaller than 2 cm, on the trunk, with a non-aggressive pathology report.    Cooper Avila MD, PhD

## 2025-04-03 NOTE — PROGRESS NOTES
Excision Operative Note  + This lower risk lesion on a lower risk anatomic site is indicated for WLE.  - Mohs is not indicated for lesions at this site.  The patient agreed with WLE today.    Date of Surgery:  4/3/2025  Surgeon:  Cooper Avila MD PhD  Office Location: [address]  [facility] [address]  Dept: [phone]  Dept Fax: [phone]  Referring Provider: Avelino Suárez MD  [address] [facility] [address]  [place] [address]    Subjective   Thomas A Berardinelli is a 69 y.o. male who presents for the following: MOHS Surgery (Left thoracic back, BCC) for basal cell carcinoma.    According to the patient, the lesion has been present for approximately 6 months at the time of diagnosis.  The lesion is not causing symptoms.  The lesion has not been treated previously.    The patient does not have a pacemaker / defibrillator.  The patient does not have a heart valve / joint replacement.    The patient is not on blood thinners.   The patient does not have a history of hepatitis B or C.  The patient does not have a history of HIV.  The patient does not have a history of immunosuppression (e.g. organ transplantation, malignancy, medications)      The following portions of the chart were reviewed this encounter and updated as appropriate:         Assessment/Plan   Pre-procedure:   Obtained informed consent: written from patient  The surgical site was identified and confirmed with the patient.     Intra-operative:   Audible time out called at : 11:20 AM 04/03/25  by: Cyndy Viramontes RN   Verified patient name, birthdate, site, specimen bottle label & requisition.    The planned procedure(s) was again reviewed with the patient. The risks of bleeding, infection, nerve damage and scarring were reviewed. The patient identity, surgical site, and planned procedure(s) were verified.     Biopsy Accession Number:  [id number]  Basal cell  carcinoma (BCC) of skin of other part of torso  Left Lower Back    Skin excision    Lesion length (cm):  1.2  Lesion width (cm):  1.8  Margin per side (cm):  0.5  Total excision diameter (cm):  2.8  Informed consent: discussed and consent obtained    Timeout: patient name, date of birth, surgical site, and procedure verified    Procedure prep:  Patient prepped in sterile fashion  Anesthesia: the lesion was anesthetized in a standard fashion    Anesthetic:  1% lidocaine w/ epinephrine 1-100,000 local infiltration  Instrument used: #10 blade    Hemostasis achieved with: electrodesiccation    Outcome: patient tolerated procedure well with no complications    Post-procedure details: sterile dressing applied and wound care instructions given    Dressing type: pressure dressing, Hypafix, petrolatum and Telfa pad    Additional details:  The nature of the diagnosis was explained. The lesion is a skin cancer.  It is locally aggressive but has a very low to non-existent risk of spreading.  The condition is associated with sun exposure.  Warning signs of non-melanoma skin cancer discussed. Patient was instructed to perform monthly self skin examination.  We recommended that the patient have regular full skin exams given an increased risk of subsequent skin cancers. The patient was instructed to use sun protective behaviors including use of broad spectrum sunscreens and sun protective clothing to reduce risk of skin cancers.  Excision was discussed with the patient. The risks, benefits and potential adverse effects were reviewed. Discussion included but was not limited to the cure rate, relative cost, wound care requirements, activity restrictions, likely scar outcome and time to heal were reviewed. It was explained that the scar would be longer than the original lesion.  The patient elected to proceed with exicision today.    Skin repair  Complexity: Intermediate  Final length (cm):  6  Informed consent: discussed and consent  obtained    Timeout: patient name, date of birth, surgical site, and procedure verified    Procedure prep:  Patient prepped in sterile fashion  Anesthesia: the lesion was anesthetized in a standard fashion    Anesthetic:  1% lidocaine w/ epinephrine 1-100,000 local infiltration  Reason for type of repair: reduce tension to allow closure    Undermining: edges undermined    Subcutaneous layers (deep stitches):   Suture size:  3-0  Suture type: Vicryl (polyglactin 910)    Stitches:  Buried vertical mattress  Fine/surface layer approximation (top stitches):   Suture size:  4-0  Suture type comment:  Plain gut  Stitches: simple running    Hemostasis achieved with: electrodesiccation  Outcome: patient tolerated procedure well with no complications    Post-procedure details: sterile dressing applied and wound care instructions given    Dressing type: pressure dressing, bandage and petrolatum      Specimen 1 - Dermatopathology- DERM LAB  Differential Diagnosis: BCC  Check Margins Yes    Comments:  ref [id number]  Dermpath Lab: Routine Histopathology (formalin-fixed tissue)      Intermediate Linear Repair:  Given the location and size of the defect, it was determined that an intermediate layered linear closure was required to restore normal anatomy and function. The repair is an intermediate closure as two layers of sutures were required. The defect was undermined extensively at the level of the subcutaneous plane. Standing cutaneous cones were removed using Burow's triangles. The wound edges were brought into close approximation with buried vertical mattress sutures. The remainder of the wound was then closed with epidermal top sutures.              The final repair measured 6 cm    Wound care was discussed, and the patient was given written post-operative wound care instructions.      The patient will follow up with Cooper Avila MD PhD as needed for any post operative problems or concerns, and will follow up with  their primary dermatologist as scheduled.

## 2025-04-05 LAB
ALBUMIN SERPL-MCNC: 4.5 G/DL (ref 3.6–5.1)
ALP SERPL-CCNC: 62 U/L (ref 35–144)
ALT SERPL-CCNC: 25 U/L (ref 9–46)
ANION GAP SERPL CALCULATED.4IONS-SCNC: 9 MMOL/L (CALC) (ref 7–17)
AST SERPL-CCNC: 32 U/L (ref 10–35)
BASOPHILS # BLD AUTO: 42 CELLS/UL (ref 0–200)
BASOPHILS NFR BLD AUTO: 0.6 %
BILIRUB DIRECT SERPL-MCNC: 0.3 MG/DL
BILIRUB SERPL-MCNC: 1.6 MG/DL (ref 0.2–1.2)
BUN SERPL-MCNC: 8 MG/DL (ref 7–25)
CALCIUM SERPL-MCNC: 9.2 MG/DL (ref 8.6–10.3)
CHLORIDE SERPL-SCNC: 102 MMOL/L (ref 98–110)
CO2 SERPL-SCNC: 28 MMOL/L (ref 20–32)
CREAT SERPL-MCNC: 0.69 MG/DL (ref 0.7–1.35)
EGFRCR SERPLBLD CKD-EPI 2021: 100 ML/MIN/1.73M2
EOSINOPHIL # BLD AUTO: 231 CELLS/UL (ref 15–500)
EOSINOPHIL NFR BLD AUTO: 3.3 %
ERYTHROCYTE [DISTWIDTH] IN BLOOD BY AUTOMATED COUNT: 13.4 % (ref 11–15)
FERRITIN SERPL-MCNC: 35 NG/ML (ref 24–380)
GLUCOSE SERPL-MCNC: 97 MG/DL (ref 65–99)
HCT VFR BLD AUTO: 41.6 % (ref 38.5–50)
HGB BLD-MCNC: 13.7 G/DL (ref 13.2–17.1)
IRON SATN MFR SERPL: 25 % (CALC) (ref 20–48)
IRON SERPL-MCNC: 85 MCG/DL (ref 50–180)
LYMPHOCYTES # BLD AUTO: 1554 CELLS/UL (ref 850–3900)
LYMPHOCYTES NFR BLD AUTO: 22.2 %
MCH RBC QN AUTO: 29.6 PG (ref 27–33)
MCHC RBC AUTO-ENTMCNC: 32.9 G/DL (ref 32–36)
MCV RBC AUTO: 89.8 FL (ref 80–100)
MONOCYTES # BLD AUTO: 525 CELLS/UL (ref 200–950)
MONOCYTES NFR BLD AUTO: 7.5 %
NEUTROPHILS # BLD AUTO: 4648 CELLS/UL (ref 1500–7800)
NEUTROPHILS NFR BLD AUTO: 66.4 %
PLATELET # BLD AUTO: 200 THOUSAND/UL (ref 140–400)
PMV BLD REES-ECKER: 12.5 FL (ref 7.5–12.5)
POTASSIUM SERPL-SCNC: 4.2 MMOL/L (ref 3.5–5.3)
PROT SERPL-MCNC: 7.2 G/DL (ref 6.1–8.1)
RBC # BLD AUTO: 4.63 MILLION/UL (ref 4.2–5.8)
SODIUM SERPL-SCNC: 139 MMOL/L (ref 135–146)
TIBC SERPL-MCNC: 343 MCG/DL (CALC) (ref 250–425)
WBC # BLD AUTO: 7 THOUSAND/UL (ref 3.8–10.8)

## 2025-04-07 LAB
LABORATORY COMMENT REPORT: NORMAL
PATH REPORT.FINAL DX SPEC: NORMAL
PATH REPORT.GROSS SPEC: NORMAL
PATH REPORT.MICROSCOPIC SPEC OTHER STN: NORMAL
PATH REPORT.RELEVANT HX SPEC: NORMAL
PATH REPORT.TOTAL CANCER: NORMAL

## 2025-04-09 NOTE — RESULT ENCOUNTER NOTE
Patient called. No answer. Voicemail left with the following results.     The margins are free of atypia. No further treatment is needed.    Clinical follow up is recommended for any changes or new lesions of concern.

## 2025-05-11 DIAGNOSIS — E55.9 VITAMIN D INSUFFICIENCY: ICD-10-CM

## 2025-05-11 DIAGNOSIS — E78.2 MIXED HYPERLIPIDEMIA: ICD-10-CM

## 2025-05-11 DIAGNOSIS — R73.9 HYPERGLYCEMIA: ICD-10-CM

## 2025-05-11 DIAGNOSIS — R93.2 ABNORMAL LIVER ULTRASOUND: ICD-10-CM

## 2025-05-11 DIAGNOSIS — E78.5 HYPERLIPIDEMIA, UNSPECIFIED HYPERLIPIDEMIA TYPE: ICD-10-CM

## 2025-07-03 ENCOUNTER — TELEPHONE (OUTPATIENT)
Dept: VASCULAR SURGERY | Facility: CLINIC | Age: 70
End: 2025-07-03
Payer: MEDICARE

## 2025-07-14 ENCOUNTER — APPOINTMENT (OUTPATIENT)
Dept: DERMATOLOGY | Facility: CLINIC | Age: 70
End: 2025-07-14
Payer: MEDICARE

## 2025-08-01 ENCOUNTER — PATIENT MESSAGE (OUTPATIENT)
Dept: PRIMARY CARE | Facility: CLINIC | Age: 70
End: 2025-08-01
Payer: MEDICARE

## 2025-08-11 ENCOUNTER — APPOINTMENT (OUTPATIENT)
Dept: PRIMARY CARE | Facility: CLINIC | Age: 70
End: 2025-08-11
Payer: MEDICARE

## 2025-08-11 ENCOUNTER — OFFICE VISIT (OUTPATIENT)
Dept: PRIMARY CARE | Facility: CLINIC | Age: 70
End: 2025-08-11
Payer: MEDICARE

## 2025-08-11 VITALS
HEIGHT: 69 IN | WEIGHT: 218 LBS | BODY MASS INDEX: 32.29 KG/M2 | HEART RATE: 67 BPM | SYSTOLIC BLOOD PRESSURE: 168 MMHG | DIASTOLIC BLOOD PRESSURE: 83 MMHG

## 2025-08-11 DIAGNOSIS — E55.9 VITAMIN D INSUFFICIENCY: ICD-10-CM

## 2025-08-11 DIAGNOSIS — R39.14 BENIGN PROSTATIC HYPERPLASIA WITH INCOMPLETE BLADDER EMPTYING: ICD-10-CM

## 2025-08-11 DIAGNOSIS — E80.4 GILBERT'S DISEASE: ICD-10-CM

## 2025-08-11 DIAGNOSIS — E78.5 HYPERLIPIDEMIA, UNSPECIFIED HYPERLIPIDEMIA TYPE: Primary | ICD-10-CM

## 2025-08-11 DIAGNOSIS — N40.1 BENIGN PROSTATIC HYPERPLASIA WITH INCOMPLETE BLADDER EMPTYING: ICD-10-CM

## 2025-08-11 DIAGNOSIS — Z01.00 ROUTINE EYE EXAM: ICD-10-CM

## 2025-08-11 DIAGNOSIS — K76.0 FATTY (CHANGE OF) LIVER, NOT ELSEWHERE CLASSIFIED: ICD-10-CM

## 2025-08-11 DIAGNOSIS — R97.20 ELEVATED PSA: ICD-10-CM

## 2025-08-11 DIAGNOSIS — I10 BENIGN ESSENTIAL HYPERTENSION: ICD-10-CM

## 2025-08-11 PROCEDURE — 1160F RVW MEDS BY RX/DR IN RCRD: CPT | Performed by: INTERNAL MEDICINE

## 2025-08-11 PROCEDURE — 1159F MED LIST DOCD IN RCRD: CPT | Performed by: INTERNAL MEDICINE

## 2025-08-11 PROCEDURE — 1036F TOBACCO NON-USER: CPT | Performed by: INTERNAL MEDICINE

## 2025-08-11 PROCEDURE — 3079F DIAST BP 80-89 MM HG: CPT | Performed by: INTERNAL MEDICINE

## 2025-08-11 PROCEDURE — 3077F SYST BP >= 140 MM HG: CPT | Performed by: INTERNAL MEDICINE

## 2025-08-11 PROCEDURE — 3008F BODY MASS INDEX DOCD: CPT | Performed by: INTERNAL MEDICINE

## 2025-08-11 PROCEDURE — G2211 COMPLEX E/M VISIT ADD ON: HCPCS | Performed by: INTERNAL MEDICINE

## 2025-08-11 PROCEDURE — 99214 OFFICE O/P EST MOD 30 MIN: CPT | Performed by: INTERNAL MEDICINE

## 2025-08-11 ASSESSMENT — PATIENT HEALTH QUESTIONNAIRE - PHQ9
2. FEELING DOWN, DEPRESSED OR HOPELESS: NOT AT ALL
1. LITTLE INTEREST OR PLEASURE IN DOING THINGS: NOT AT ALL
SUM OF ALL RESPONSES TO PHQ9 QUESTIONS 1 AND 2: 0

## 2025-09-17 ENCOUNTER — APPOINTMENT (OUTPATIENT)
Dept: UROLOGY | Facility: CLINIC | Age: 70
End: 2025-09-17
Payer: MEDICARE

## 2025-09-24 ENCOUNTER — APPOINTMENT (OUTPATIENT)
Dept: DERMATOLOGY | Facility: CLINIC | Age: 70
End: 2025-09-24
Payer: MEDICARE

## 2025-11-26 ENCOUNTER — APPOINTMENT (OUTPATIENT)
Dept: OPHTHALMOLOGY | Facility: CLINIC | Age: 70
End: 2025-11-26
Payer: MEDICARE

## 2026-02-16 ENCOUNTER — APPOINTMENT (OUTPATIENT)
Dept: PRIMARY CARE | Facility: CLINIC | Age: 71
End: 2026-02-16
Payer: MEDICARE